# Patient Record
Sex: MALE | Race: BLACK OR AFRICAN AMERICAN | Employment: OTHER | URBAN - METROPOLITAN AREA
[De-identification: names, ages, dates, MRNs, and addresses within clinical notes are randomized per-mention and may not be internally consistent; named-entity substitution may affect disease eponyms.]

---

## 2017-08-16 ENCOUNTER — OFFICE VISIT (OUTPATIENT)
Dept: PRIMARY CARE CLINIC | Age: 82
End: 2017-08-16

## 2017-08-16 VITALS
TEMPERATURE: 98.5 F | WEIGHT: 136 LBS | HEART RATE: 90 BPM | BODY MASS INDEX: 23.22 KG/M2 | OXYGEN SATURATION: 98 % | HEIGHT: 64 IN | RESPIRATION RATE: 16 BRPM | SYSTOLIC BLOOD PRESSURE: 133 MMHG | DIASTOLIC BLOOD PRESSURE: 74 MMHG

## 2017-08-16 DIAGNOSIS — Z23 NEED FOR PROPHYLACTIC VACCINATION AGAINST STREPTOCOCCUS PNEUMONIAE (PNEUMOCOCCUS): ICD-10-CM

## 2017-08-16 DIAGNOSIS — Z23 NEED FOR PROPHYLACTIC VACCINATION AND INOCULATION AGAINST VARICELLA: ICD-10-CM

## 2017-08-16 DIAGNOSIS — M48.062 LUMBAR STENOSIS WITH NEUROGENIC CLAUDICATION: Primary | ICD-10-CM

## 2017-08-16 DIAGNOSIS — Z23 NEED FOR PROPHYLACTIC VACCINATION AGAINST DIPHTHERIA-TETANUS-PERTUSSIS (DTP): ICD-10-CM

## 2017-08-16 DIAGNOSIS — R63.4 WEIGHT LOSS: ICD-10-CM

## 2017-08-16 LAB
A/G RATIO: 1.5 (ref 1.1–2.2)
ALBUMIN SERPL-MCNC: 4.5 G/DL (ref 3.4–5)
ALP BLD-CCNC: 69 U/L (ref 40–129)
ALT SERPL-CCNC: 12 U/L (ref 10–40)
ANION GAP SERPL CALCULATED.3IONS-SCNC: 18 MMOL/L (ref 3–16)
AST SERPL-CCNC: 23 U/L (ref 15–37)
BASOPHILS ABSOLUTE: 0 K/UL (ref 0–0.2)
BASOPHILS RELATIVE PERCENT: 0.8 %
BILIRUB SERPL-MCNC: 1.4 MG/DL (ref 0–1)
BILIRUBIN URINE: NEGATIVE
BLOOD, URINE: NEGATIVE
BUN BLDV-MCNC: 11 MG/DL (ref 7–20)
CALCIUM SERPL-MCNC: 9.5 MG/DL (ref 8.3–10.6)
CHLORIDE BLD-SCNC: 99 MMOL/L (ref 99–110)
CLARITY: CLEAR
CO2: 24 MMOL/L (ref 21–32)
COLOR: YELLOW
CREAT SERPL-MCNC: 0.9 MG/DL (ref 0.8–1.3)
EOSINOPHILS ABSOLUTE: 0.1 K/UL (ref 0–0.6)
EOSINOPHILS RELATIVE PERCENT: 1.5 %
GFR AFRICAN AMERICAN: >60
GFR NON-AFRICAN AMERICAN: >60
GLOBULIN: 3.1 G/DL
GLUCOSE BLD-MCNC: 90 MG/DL (ref 70–99)
GLUCOSE URINE: NEGATIVE MG/DL
HCT VFR BLD CALC: 43 % (ref 40.5–52.5)
HEMOGLOBIN: 14 G/DL (ref 13.5–17.5)
KETONES, URINE: ABNORMAL MG/DL
LEUKOCYTE ESTERASE, URINE: NEGATIVE
LYMPHOCYTES ABSOLUTE: 1.8 K/UL (ref 1–5.1)
LYMPHOCYTES RELATIVE PERCENT: 31.3 %
MCH RBC QN AUTO: 27.4 PG (ref 26–34)
MCHC RBC AUTO-ENTMCNC: 32.5 G/DL (ref 31–36)
MCV RBC AUTO: 84.4 FL (ref 80–100)
MICROSCOPIC EXAMINATION: ABNORMAL
MONOCYTES ABSOLUTE: 0.4 K/UL (ref 0–1.3)
MONOCYTES RELATIVE PERCENT: 7 %
NEUTROPHILS ABSOLUTE: 3.4 K/UL (ref 1.7–7.7)
NEUTROPHILS RELATIVE PERCENT: 59.4 %
NITRITE, URINE: NEGATIVE
PDW BLD-RTO: 15.3 % (ref 12.4–15.4)
PH UA: 6
PLATELET # BLD: 145 K/UL (ref 135–450)
PMV BLD AUTO: 9.6 FL (ref 5–10.5)
POTASSIUM SERPL-SCNC: 3.6 MMOL/L (ref 3.5–5.1)
PROTEIN UA: NEGATIVE MG/DL
RBC # BLD: 5.1 M/UL (ref 4.2–5.9)
SODIUM BLD-SCNC: 141 MMOL/L (ref 136–145)
SPECIFIC GRAVITY UA: 1.01
TOTAL PROTEIN: 7.6 G/DL (ref 6.4–8.2)
TSH SERPL DL<=0.05 MIU/L-ACNC: 0.61 UIU/ML (ref 0.27–4.2)
URINE TYPE: ABNORMAL
UROBILINOGEN, URINE: 1 E.U./DL
WBC # BLD: 5.7 K/UL (ref 4–11)

## 2017-08-16 PROCEDURE — 99204 OFFICE O/P NEW MOD 45 MIN: CPT | Performed by: FAMILY MEDICINE

## 2017-08-16 PROCEDURE — 90471 IMMUNIZATION ADMIN: CPT | Performed by: FAMILY MEDICINE

## 2017-08-16 PROCEDURE — 90715 TDAP VACCINE 7 YRS/> IM: CPT | Performed by: FAMILY MEDICINE

## 2017-08-16 PROCEDURE — 90732 PPSV23 VACC 2 YRS+ SUBQ/IM: CPT | Performed by: FAMILY MEDICINE

## 2017-08-16 PROCEDURE — 90472 IMMUNIZATION ADMIN EACH ADD: CPT | Performed by: FAMILY MEDICINE

## 2017-08-16 RX ORDER — GABAPENTIN 300 MG/1
300 CAPSULE ORAL 3 TIMES DAILY
Qty: 90 CAPSULE | Refills: 4 | Status: SHIPPED | OUTPATIENT
Start: 2017-08-16 | End: 2017-12-31 | Stop reason: SDUPTHER

## 2017-08-16 ASSESSMENT — ENCOUNTER SYMPTOMS
FACIAL SWELLING: 0
PHOTOPHOBIA: 0
BOWEL INCONTINENCE: 0
BLOOD IN STOOL: 0
RECTAL PAIN: 0
SINUS PRESSURE: 0
ABDOMINAL PAIN: 0
APNEA: 0
CONSTIPATION: 0
COLOR CHANGE: 0
SORE THROAT: 0
WHEEZING: 0
EYE ITCHING: 0
EYE PAIN: 0
COUGH: 0
ANAL BLEEDING: 0
BACK PAIN: 1
VOMITING: 0
CHEST TIGHTNESS: 0
VOICE CHANGE: 0
CHOKING: 0
NAUSEA: 0
TROUBLE SWALLOWING: 0
SHORTNESS OF BREATH: 0
EYE DISCHARGE: 0
EYE REDNESS: 0

## 2017-10-16 ENCOUNTER — OFFICE VISIT (OUTPATIENT)
Dept: PRIMARY CARE CLINIC | Age: 82
End: 2017-10-16

## 2017-10-16 VITALS
WEIGHT: 143 LBS | BODY MASS INDEX: 24.55 KG/M2 | SYSTOLIC BLOOD PRESSURE: 138 MMHG | DIASTOLIC BLOOD PRESSURE: 70 MMHG | HEART RATE: 70 BPM

## 2017-10-16 DIAGNOSIS — M48.062 LUMBAR STENOSIS WITH NEUROGENIC CLAUDICATION: Primary | ICD-10-CM

## 2017-10-16 PROCEDURE — 99213 OFFICE O/P EST LOW 20 MIN: CPT | Performed by: FAMILY MEDICINE

## 2017-10-16 ASSESSMENT — ENCOUNTER SYMPTOMS
SINUS PRESSURE: 0
SHORTNESS OF BREATH: 0
COLOR CHANGE: 0
CHEST TIGHTNESS: 0
TROUBLE SWALLOWING: 0
ANAL BLEEDING: 0
BLOOD IN STOOL: 0
ABDOMINAL PAIN: 0
EYE REDNESS: 0
BACK PAIN: 1
APNEA: 0
VOICE CHANGE: 0
COUGH: 0
CHOKING: 0
FACIAL SWELLING: 0
SORE THROAT: 0
EYE ITCHING: 0
EYE DISCHARGE: 0
RECTAL PAIN: 0
PHOTOPHOBIA: 0
VOMITING: 0
WHEEZING: 0
EYE PAIN: 0
NAUSEA: 0
CONSTIPATION: 0

## 2017-10-16 ASSESSMENT — PATIENT HEALTH QUESTIONNAIRE - PHQ9
2. FEELING DOWN, DEPRESSED OR HOPELESS: 0
1. LITTLE INTEREST OR PLEASURE IN DOING THINGS: 0
SUM OF ALL RESPONSES TO PHQ9 QUESTIONS 1 & 2: 0
SUM OF ALL RESPONSES TO PHQ QUESTIONS 1-9: 0

## 2017-12-31 DIAGNOSIS — M48.062 LUMBAR STENOSIS WITH NEUROGENIC CLAUDICATION: ICD-10-CM

## 2018-01-02 RX ORDER — GABAPENTIN 300 MG/1
300 CAPSULE ORAL 3 TIMES DAILY
Qty: 90 CAPSULE | Refills: 0 | Status: SHIPPED | OUTPATIENT
Start: 2018-01-02 | End: 2018-01-30

## 2018-01-02 NOTE — TELEPHONE ENCOUNTER
Requested Prescriptions     Pending Prescriptions Disp Refills    gabapentin (NEURONTIN) 300 MG capsule [Pharmacy Med Name: GABAPENTIN 300 MG CAPSULE] 90 capsule 4     Sig: Take 1 capsule by mouth 3 times daily.      Last Fill Date: 8/16/2017   R:4  Last OV: 10/16/2017

## 2018-01-16 ENCOUNTER — OFFICE VISIT (OUTPATIENT)
Dept: PRIMARY CARE CLINIC | Age: 83
End: 2018-01-16

## 2018-01-16 VITALS
WEIGHT: 148 LBS | OXYGEN SATURATION: 99 % | TEMPERATURE: 97.1 F | HEART RATE: 74 BPM | DIASTOLIC BLOOD PRESSURE: 73 MMHG | HEIGHT: 64 IN | SYSTOLIC BLOOD PRESSURE: 138 MMHG | BODY MASS INDEX: 25.27 KG/M2

## 2018-01-16 DIAGNOSIS — M48.062 LUMBAR STENOSIS WITH NEUROGENIC CLAUDICATION: Primary | ICD-10-CM

## 2018-01-16 PROCEDURE — 99213 OFFICE O/P EST LOW 20 MIN: CPT | Performed by: FAMILY MEDICINE

## 2018-01-16 ASSESSMENT — ENCOUNTER SYMPTOMS
ABDOMINAL PAIN: 0
CHEST TIGHTNESS: 0
BACK PAIN: 1
CONSTIPATION: 0
BLOOD IN STOOL: 0
WHEEZING: 0
SHORTNESS OF BREATH: 0
EYE PAIN: 0
EYE REDNESS: 0
VOMITING: 0
SINUS PRESSURE: 0
ANAL BLEEDING: 0
RECTAL PAIN: 0
CHOKING: 0
FACIAL SWELLING: 0
TROUBLE SWALLOWING: 0
COLOR CHANGE: 0
EYE DISCHARGE: 0
VOICE CHANGE: 0
EYE ITCHING: 0
PHOTOPHOBIA: 0
SORE THROAT: 0
APNEA: 0
COUGH: 0
NAUSEA: 0

## 2018-01-16 NOTE — PROGRESS NOTES
ideas. The patient is not nervous/anxious and is not hyperactive. Objective:   Physical Exam   Constitutional: He is oriented to person, place, and time. He appears well-developed and well-nourished. No distress. HENT:   Head: Normocephalic and atraumatic. Right Ear: External ear normal.   Left Ear: External ear normal.   Nose: Nose normal.   Mouth/Throat: Oropharynx is clear and moist. No oropharyngeal exudate. Eyes: Conjunctivae and EOM are normal. Pupils are equal, round, and reactive to light. Right eye exhibits no discharge. Left eye exhibits no discharge. No scleral icterus. Neck: Normal range of motion. Neck supple. No JVD present. No tracheal deviation present. No thyromegaly present. Cardiovascular: Normal rate, regular rhythm, normal heart sounds and intact distal pulses. Exam reveals no friction rub. No murmur heard. Pulses:       Carotid pulses are 2+ on the right side, and 2+ on the left side. Radial pulses are 2+ on the right side, and 2+ on the left side. Femoral pulses are 2+ on the right side, and 2+ on the left side. Popliteal pulses are 2+ on the right side, and 2+ on the left side. Dorsalis pedis pulses are 2+ on the right side, and 2+ on the left side. Posterior tibial pulses are 2+ on the right side, and 2+ on the left side. Pulmonary/Chest: Effort normal and breath sounds normal. No stridor. No respiratory distress. He has no wheezes. He has no rales. He exhibits no tenderness. Abdominal: Soft. Bowel sounds are normal. He exhibits no distension and no mass. There is no tenderness. There is no rebound and no guarding. Musculoskeletal: Normal range of motion. He exhibits no edema or tenderness. Lymphadenopathy:     He has no cervical adenopathy. Neurological: He is oriented to person, place, and time. He displays normal reflexes. No cranial nerve deficit. He exhibits normal muscle tone.  Coordination normal.   Skin: Skin is warm and

## 2018-01-29 DIAGNOSIS — M48.062 LUMBAR STENOSIS WITH NEUROGENIC CLAUDICATION: ICD-10-CM

## 2018-01-30 ENCOUNTER — OFFICE VISIT (OUTPATIENT)
Dept: PRIMARY CARE CLINIC | Age: 83
End: 2018-01-30

## 2018-01-30 VITALS
WEIGHT: 144 LBS | TEMPERATURE: 97.5 F | HEIGHT: 65 IN | BODY MASS INDEX: 23.99 KG/M2 | SYSTOLIC BLOOD PRESSURE: 142 MMHG | OXYGEN SATURATION: 99 % | HEART RATE: 72 BPM | DIASTOLIC BLOOD PRESSURE: 71 MMHG

## 2018-01-30 DIAGNOSIS — Z00.00 ROUTINE GENERAL MEDICAL EXAMINATION AT A HEALTH CARE FACILITY: Primary | ICD-10-CM

## 2018-01-30 PROCEDURE — G0438 PPPS, INITIAL VISIT: HCPCS | Performed by: FAMILY MEDICINE

## 2018-01-30 ASSESSMENT — ANXIETY QUESTIONNAIRES: GAD7 TOTAL SCORE: 0

## 2018-01-30 ASSESSMENT — PATIENT HEALTH QUESTIONNAIRE - PHQ9: SUM OF ALL RESPONSES TO PHQ QUESTIONS 1-9: 0

## 2018-01-30 ASSESSMENT — LIFESTYLE VARIABLES: HOW OFTEN DO YOU HAVE A DRINK CONTAINING ALCOHOL: 0

## 2018-01-30 NOTE — PROGRESS NOTES
Medicare Annual Wellness Visit  Name: Gloria España Date: 2018   MRN: [id number] Sex: Male   Age: 80 y.o. Ethnicity: Non-/Non    : 1934 Race: Nikita Baeza is here for Medicare AWV    Screenings for behavioral, psychosocial and functional/safety risks, and cognitive dysfunction are all negative except as indicated below. These results, as well as other patient data from the [address] form, are documented in Flowsheets linked to this Encounter. No Known Allergies  Prior to Visit Medications    Medication Sig Taking? Authorizing Provider   gabapentin (NEURONTIN) 300 MG capsule Take 1 capsule by mouth 3 times daily. Paddy Kilgore MD   BESIVANCE 0.6 % SUSP   Historical Provider, MD   COMBIGAN 0.2-0.5 % ophthalmic solution   Historical Provider, MD   dorzolamide (TRUSOPT) 2 % ophthalmic solution   Historical Provider, MD   fluorometholone (FML) 0.1 % ophthalmic suspension   Historical Provider, MD   latanoprost (XALATAN) 0.005 % ophthalmic solution   Historical Provider, MD   PRED FORTE 1 % ophthalmic suspension   Historical Provider, MD   diclofenac (VOLTAREN) 75 MG EC tablet Take 1 tablet by mouth daily. May repeat in 12 hours as needed. Take with food. Stop other anti inflammatories while taking this medication. Summer Singh MD     History reviewed. No pertinent past medical history.   Past Surgical History:   Procedure Laterality Date    CATARACT REMOVAL      GLAUCOMA SURGERY       Family History   Problem Relation Age of Onset    Glaucoma Mother     Glaucoma Father        CareTeam (Including outside providers/suppliers regularly involved in providing care):   Patient Care Team:  Paddy Kilgore MD as PCP - General (Family Medicine)    Wt Readings from Last 3 Encounters:   18 144 lb (65.3 kg)   18 148 lb (67.1 kg)   10/16/17 143 lb (64.9 kg)     Vitals:    18 1046 18 1048   BP: (!) 151/74 (!) 142/71   Pulse: 72

## 2018-02-02 RX ORDER — GABAPENTIN 300 MG/1
300 CAPSULE ORAL 3 TIMES DAILY
Qty: 90 CAPSULE | Refills: 0 | Status: SHIPPED | OUTPATIENT
Start: 2018-02-02 | End: 2018-06-01 | Stop reason: ALTCHOICE

## 2018-06-01 ENCOUNTER — OFFICE VISIT (OUTPATIENT)
Dept: PRIMARY CARE CLINIC | Age: 83
End: 2018-06-01

## 2018-06-01 VITALS
HEIGHT: 64 IN | TEMPERATURE: 97.2 F | BODY MASS INDEX: 24.59 KG/M2 | HEART RATE: 66 BPM | WEIGHT: 144 LBS | DIASTOLIC BLOOD PRESSURE: 71 MMHG | SYSTOLIC BLOOD PRESSURE: 150 MMHG | OXYGEN SATURATION: 98 %

## 2018-06-01 DIAGNOSIS — M48.062 LUMBAR STENOSIS WITH NEUROGENIC CLAUDICATION: Primary | ICD-10-CM

## 2018-06-01 PROCEDURE — 99213 OFFICE O/P EST LOW 20 MIN: CPT | Performed by: FAMILY MEDICINE

## 2018-06-01 ASSESSMENT — ENCOUNTER SYMPTOMS
TROUBLE SWALLOWING: 0
PHOTOPHOBIA: 0
EYE REDNESS: 0
BACK PAIN: 1
EYE DISCHARGE: 0
ANAL BLEEDING: 0
SINUS PRESSURE: 0
FACIAL SWELLING: 0
CHEST TIGHTNESS: 0
EYE PAIN: 0
NAUSEA: 0
ABDOMINAL PAIN: 0
COLOR CHANGE: 0
VOMITING: 0
SHORTNESS OF BREATH: 0
VOICE CHANGE: 0
RECTAL PAIN: 0
BLOOD IN STOOL: 0
COUGH: 0
CHOKING: 0
CONSTIPATION: 0
SORE THROAT: 0
EYE ITCHING: 0
APNEA: 0
WHEEZING: 0

## 2018-08-01 ENCOUNTER — OFFICE VISIT (OUTPATIENT)
Dept: PRIMARY CARE CLINIC | Age: 83
End: 2018-08-01

## 2018-08-01 VITALS
HEIGHT: 64 IN | HEART RATE: 73 BPM | SYSTOLIC BLOOD PRESSURE: 120 MMHG | DIASTOLIC BLOOD PRESSURE: 80 MMHG | OXYGEN SATURATION: 99 % | TEMPERATURE: 99 F | WEIGHT: 146.4 LBS | RESPIRATION RATE: 16 BRPM | BODY MASS INDEX: 25 KG/M2

## 2018-08-01 DIAGNOSIS — M48.061 SPINAL STENOSIS OF LUMBAR REGION, UNSPECIFIED WHETHER NEUROGENIC CLAUDICATION PRESENT: Primary | ICD-10-CM

## 2018-08-01 PROCEDURE — 99213 OFFICE O/P EST LOW 20 MIN: CPT | Performed by: FAMILY MEDICINE

## 2018-08-01 ASSESSMENT — ENCOUNTER SYMPTOMS
EYE ITCHING: 0
CHOKING: 0
PHOTOPHOBIA: 0
EYE DISCHARGE: 0
BACK PAIN: 1
APNEA: 0
SORE THROAT: 0
CONSTIPATION: 0
FACIAL SWELLING: 0
WHEEZING: 0
CHEST TIGHTNESS: 0
BLOOD IN STOOL: 0
EYE PAIN: 0
SHORTNESS OF BREATH: 0
RECTAL PAIN: 0
EYE REDNESS: 0
TROUBLE SWALLOWING: 0
VOICE CHANGE: 0
NAUSEA: 0
COLOR CHANGE: 0
COUGH: 0
VOMITING: 0
ANAL BLEEDING: 0
ABDOMINAL PAIN: 0
SINUS PRESSURE: 0

## 2018-08-01 NOTE — PROGRESS NOTES
Subjective:      Patient ID: Layla Hahn is a 80 y.o. male. Back Pain   Pertinent negatives include no abdominal pain, chest pain, dysuria, fever, headaches, numbness or weakness. The chief complaint(s)  Fu for low back pain with pain in legs  HPIHe is fu for low back pain with rad to legs and diagnosis of Lumbar stenosis and neurogenic claudication. Does not want surgery for this. Pain is 5 of 10. No bladder or bowel problems. On gabapentin.       Review of Systems   Constitutional: Negative for activity change, appetite change, chills, diaphoresis, fatigue, fever and unexpected weight change. HENT: Negative for congestion, dental problem, drooling, ear discharge, ear pain, facial swelling, hearing loss, mouth sores, nosebleeds, postnasal drip, sinus pressure, sneezing, sore throat, tinnitus, trouble swallowing and voice change. Eyes: Negative for photophobia, pain, discharge, redness, itching and visual disturbance. Respiratory: Negative for apnea, cough, choking, chest tightness, shortness of breath and wheezing. Cardiovascular: Negative for chest pain, palpitations and leg swelling. Gastrointestinal: Negative for abdominal pain, anal bleeding, blood in stool, constipation, nausea, rectal pain and vomiting. Genitourinary: Negative for decreased urine volume, difficulty urinating, discharge, dysuria, enuresis, flank pain, frequency, hematuria, penile swelling, scrotal swelling, testicular pain and urgency. Musculoskeletal: Positive for back pain. Negative for arthralgias, gait problem, joint swelling, myalgias, neck pain and neck stiffness. Skin: Negative for color change, pallor, rash and wound. Neurological: Negative for dizziness, tremors, seizures, syncope, facial asymmetry, speech difficulty, weakness, light-headedness, numbness and headaches. Hematological: Negative for adenopathy. Does not bruise/bleed easily.    Psychiatric/Behavioral: Negative for agitation, behavioral displays normal reflexes. No cranial nerve deficit. He exhibits normal muscle tone. Coordination normal.   Skin: Skin is warm and dry. No rash noted. He is not diaphoretic. No pallor. Psychiatric: He has a normal mood and affect. His behavior is normal. Judgment and thought content normal.   Nursing note and vitals reviewed. Assessment:      1.  Lumbar stenosis with neurogenic claudication  Currently sxs improved  Not taking gabapentin  sxs much improved pain wise  Tylenol prn          Plan:      See me as needed or 6 months      See me prn

## 2019-02-01 ENCOUNTER — OFFICE VISIT (OUTPATIENT)
Dept: PRIMARY CARE CLINIC | Age: 84
End: 2019-02-01
Payer: OTHER GOVERNMENT

## 2019-02-01 VITALS
HEART RATE: 79 BPM | OXYGEN SATURATION: 98 % | BODY MASS INDEX: 25.16 KG/M2 | WEIGHT: 147.4 LBS | DIASTOLIC BLOOD PRESSURE: 85 MMHG | HEIGHT: 64 IN | TEMPERATURE: 97.4 F | SYSTOLIC BLOOD PRESSURE: 158 MMHG

## 2019-02-01 DIAGNOSIS — I10 ESSENTIAL HYPERTENSION: ICD-10-CM

## 2019-02-01 DIAGNOSIS — Z91.81 AT HIGH RISK FOR FALLS: ICD-10-CM

## 2019-02-01 DIAGNOSIS — M48.00 SPINAL STENOSIS, UNSPECIFIED SPINAL REGION: ICD-10-CM

## 2019-02-01 DIAGNOSIS — R03.0 ELEVATED BLOOD PRESSURE READING: Primary | ICD-10-CM

## 2019-02-01 DIAGNOSIS — R03.0 ELEVATED BLOOD PRESSURE READING: ICD-10-CM

## 2019-02-01 LAB
A/G RATIO: 1.2 (ref 1.1–2.2)
ALBUMIN SERPL-MCNC: 4.4 G/DL (ref 3.4–5)
ALP BLD-CCNC: 98 U/L (ref 40–129)
ALT SERPL-CCNC: 13 U/L (ref 10–40)
ANION GAP SERPL CALCULATED.3IONS-SCNC: 14 MMOL/L (ref 3–16)
AST SERPL-CCNC: 18 U/L (ref 15–37)
BACTERIA: ABNORMAL /HPF
BILIRUB SERPL-MCNC: 0.7 MG/DL (ref 0–1)
BILIRUBIN URINE: NEGATIVE
BLOOD, URINE: ABNORMAL
BUN BLDV-MCNC: 15 MG/DL (ref 7–20)
CALCIUM SERPL-MCNC: 10.1 MG/DL (ref 8.3–10.6)
CHLORIDE BLD-SCNC: 105 MMOL/L (ref 99–110)
CLARITY: ABNORMAL
CO2: 27 MMOL/L (ref 21–32)
COLOR: YELLOW
CREAT SERPL-MCNC: 1.1 MG/DL (ref 0.8–1.3)
EPITHELIAL CELLS, UA: 0 /HPF (ref 0–5)
GFR AFRICAN AMERICAN: >60
GFR NON-AFRICAN AMERICAN: >60
GLOBULIN: 3.6 G/DL
GLUCOSE BLD-MCNC: 118 MG/DL (ref 70–99)
GLUCOSE URINE: NEGATIVE MG/DL
HCT VFR BLD CALC: 42.6 % (ref 40.5–52.5)
HEMOGLOBIN: 13.9 G/DL (ref 13.5–17.5)
HYALINE CASTS: 2 /LPF (ref 0–8)
KETONES, URINE: NEGATIVE MG/DL
LEUKOCYTE ESTERASE, URINE: ABNORMAL
MCH RBC QN AUTO: 27.4 PG (ref 26–34)
MCHC RBC AUTO-ENTMCNC: 32.6 G/DL (ref 31–36)
MCV RBC AUTO: 84.1 FL (ref 80–100)
MICROSCOPIC EXAMINATION: YES
NITRITE, URINE: NEGATIVE
PDW BLD-RTO: 15.7 % (ref 12.4–15.4)
PH UA: 6
PLATELET # BLD: 282 K/UL (ref 135–450)
PMV BLD AUTO: 8.5 FL (ref 5–10.5)
POTASSIUM SERPL-SCNC: 4.6 MMOL/L (ref 3.5–5.1)
PROTEIN UA: 30 MG/DL
RBC # BLD: 5.07 M/UL (ref 4.2–5.9)
RBC UA: 3 /HPF (ref 0–4)
SODIUM BLD-SCNC: 146 MMOL/L (ref 136–145)
SPECIFIC GRAVITY UA: 1.01
TOTAL PROTEIN: 8 G/DL (ref 6.4–8.2)
URINE TYPE: ABNORMAL
UROBILINOGEN, URINE: 0.2 E.U./DL
WBC # BLD: 5.8 K/UL (ref 4–11)
WBC UA: 839 /HPF (ref 0–5)

## 2019-02-01 PROCEDURE — 99214 OFFICE O/P EST MOD 30 MIN: CPT | Performed by: FAMILY MEDICINE

## 2019-02-01 RX ORDER — ACETAMINOPHEN 500 MG
500 TABLET ORAL 4 TIMES DAILY PRN
Qty: 360 TABLET | Refills: 1 | Status: ON HOLD
Start: 2019-02-01 | End: 2021-03-24

## 2019-02-01 ASSESSMENT — ENCOUNTER SYMPTOMS
VOMITING: 0
PHOTOPHOBIA: 0
VOICE CHANGE: 0
CONSTIPATION: 0
COLOR CHANGE: 0
APNEA: 0
EYE PAIN: 0
RECTAL PAIN: 0
SORE THROAT: 0
SHORTNESS OF BREATH: 0
ANAL BLEEDING: 0
BLOOD IN STOOL: 0
TROUBLE SWALLOWING: 0
COUGH: 0
EYE ITCHING: 0
FACIAL SWELLING: 0
ORTHOPNEA: 0
BACK PAIN: 1
ABDOMINAL PAIN: 0
CHOKING: 0
BLURRED VISION: 0
CHEST TIGHTNESS: 0
SINUS PRESSURE: 0
NAUSEA: 0
BOWEL INCONTINENCE: 0
EYE REDNESS: 0
EYE DISCHARGE: 0
WHEEZING: 0

## 2019-02-01 ASSESSMENT — PATIENT HEALTH QUESTIONNAIRE - PHQ9
SUM OF ALL RESPONSES TO PHQ QUESTIONS 1-9: 0
SUM OF ALL RESPONSES TO PHQ QUESTIONS 1-9: 0
2. FEELING DOWN, DEPRESSED OR HOPELESS: 0
SUM OF ALL RESPONSES TO PHQ9 QUESTIONS 1 & 2: 0
1. LITTLE INTEREST OR PLEASURE IN DOING THINGS: 0

## 2019-02-02 DIAGNOSIS — R82.90 ABNORMAL URINALYSIS: Primary | ICD-10-CM

## 2019-02-07 LAB
ORGANISM: ABNORMAL
URINE CULTURE, ROUTINE: ABNORMAL
URINE CULTURE, ROUTINE: ABNORMAL

## 2019-02-13 ENCOUNTER — TELEPHONE (OUTPATIENT)
Dept: PRIMARY CARE CLINIC | Age: 84
End: 2019-02-13

## 2019-02-13 DIAGNOSIS — N39.0 URINARY TRACT INFECTION WITHOUT HEMATURIA, SITE UNSPECIFIED: Primary | ICD-10-CM

## 2019-02-13 RX ORDER — SULFAMETHOXAZOLE AND TRIMETHOPRIM 800; 160 MG/1; MG/1
1 TABLET ORAL 2 TIMES DAILY
Qty: 20 TABLET | Refills: 0 | Status: SHIPPED | OUTPATIENT
Start: 2019-02-13 | End: 2019-02-23

## 2019-06-03 ENCOUNTER — OFFICE VISIT (OUTPATIENT)
Dept: PRIMARY CARE CLINIC | Age: 84
End: 2019-06-03
Payer: OTHER GOVERNMENT

## 2019-06-03 VITALS
BODY MASS INDEX: 24.89 KG/M2 | WEIGHT: 145.8 LBS | SYSTOLIC BLOOD PRESSURE: 140 MMHG | HEIGHT: 64 IN | DIASTOLIC BLOOD PRESSURE: 80 MMHG | HEART RATE: 71 BPM | OXYGEN SATURATION: 98 %

## 2019-06-03 DIAGNOSIS — R03.0 ELEVATED BLOOD PRESSURE READING WITHOUT DIAGNOSIS OF HYPERTENSION: Primary | ICD-10-CM

## 2019-06-03 PROCEDURE — 99213 OFFICE O/P EST LOW 20 MIN: CPT | Performed by: FAMILY MEDICINE

## 2019-06-03 ASSESSMENT — ENCOUNTER SYMPTOMS
SHORTNESS OF BREATH: 0
ORTHOPNEA: 0
BLURRED VISION: 0

## 2019-06-03 NOTE — PATIENT INSTRUCTIONS
of sodium is given in the list under the title. It is given in milligrams (mg). ? Check the serving size carefully. A single serving is often very small, and you may eat more than one serving. If this is the case, you will eat more sodium than listed on the label. For example, if the serving size for a canned soup is 1 cup and the sodium amount is 470 mg, if you have 2 cups you will eat 940 mg of sodium. · The nutrition facts for fresh fruits and vegetables are not listed on the food. They may be listed somewhere in the store. These foods usually have no sodium or low sodium. · The Nutrition Facts label also gives you the Percent Daily Value for sodium. This is how much of the recommended amount of sodium a serving contains. The daily value for sodium is less than 2,300 mg. So if the Percent Daily Value says 50%, this means one serving is giving you half of this, or 1,150 mg. Buy low-sodium foods  · Look for foods that are made with less sodium. Watch for the following words on the label. ? \"Unsalted\" means there is no sodium added to the food. But there may be sodium already in the food naturally. ? \"Sodium-free\" means a serving has less than 5 mg of sodium. ? \"Very low sodium\" means a serving has 35 mg or less of sodium. ? \"Low-sodium\" means a serving has 140 mg or less of sodium. · \"Reduced-sodium\" means that there is 25% less sodium than what the food normally has. This is still usually too much sodium. Try not to buy foods with this on the label. · Buy fresh vegetables, or frozen vegetables without added sauces. Buy low-sodium versions of canned vegetables, soups, and other canned goods. Where can you learn more? Go to https://eMotion TechnologiesjeimyAccuNostics.Flocktory. org and sign in to your [employer] account. Enter 23 [id number] in the [facility] box to learn more about \"How to Read a Food Label to Limit Sodium: Care Instructions. \"     If you do not have an account, please click on the \"Sign Up Now\" link.  Current as of: November 7, 2018  Content Version: 12.0  © 2433-5494 Healthwise, [employer]. Care instructions adapted under license by [facility] ([facility]). If you have questions about a medical condition or this instruction, always ask your healthcare professional. [address] any warranty or liability for your use of this information. Patient Education        Low Sodium Diet (2,000 Milligram): Care Instructions  Your Care Instructions    Too much sodium causes your body to hold on to extra water. This can raise your blood pressure and force your heart and kidneys to work harder. In very serious cases, this could cause you to be put in the hospital. It might even be life-threatening. By limiting sodium, you will feel better and lower your risk of serious problems. The most common source of sodium is salt. People get most of the salt in their diet from canned, prepared, and packaged foods. Fast food and restaurant meals also are very high in sodium. Your doctor will probably limit your sodium to less than 2,000 milligrams (mg) a day. This limit counts all the sodium in prepared and packaged foods and any salt you add to your food. Follow-up care is a key part of your treatment and safety. Be sure to make and go to all appointments, and call your doctor if you are having problems. It's also a good idea to know your test results and keep a list of the medicines you take. How can you care for yourself at home? Read food labels  · Read labels on cans and food packages. The labels tell you how much sodium is in each serving. Make sure that you look at the serving size. If you eat more than the serving size, you have eaten more sodium. · Food labels also tell you the Percent Daily Value for sodium. Choose products with low Percent Daily Values for sodium.   · Be aware that sodium can come in forms other than salt, including monosodium glutamate (MSG), sodium citrate, and sodium bicarbonate (baking soda). MSG is often added to Asian food. When you eat out, you can sometimes ask for food without MSG or added salt. Buy low-sodium foods  · Buy foods that are labeled \"unsalted\" (no salt added), \"sodium-free\" (less than 5 mg of sodium per serving), or \"low-sodium\" (less than 140 mg of sodium per serving). Foods labeled \"reduced-sodium\" and \"light sodium\" may still have too much sodium. Be sure to read the label to see how much sodium you are getting. · Buy fresh vegetables, or frozen vegetables without added sauces. Buy low-sodium versions of canned vegetables, soups, and other canned goods. Prepare low-sodium meals  · Cut back on the amount of salt you use in cooking. This will help you adjust to the taste. Do not add salt after cooking. One teaspoon of salt has about 2,300 mg of sodium. · Take the salt shaker off the table. · Flavor your food with garlic, lemon juice, onion, vinegar, herbs, and spices. Do not use soy sauce, lite soy sauce, steak sauce, onion salt, garlic salt, celery salt, mustard, or ketchup on your food. · Use low-sodium salad dressings, sauces, and ketchup. Or make your own salad dressings and sauces without adding salt. · Use less salt (or none) when recipes call for it. You can often use half the salt a recipe calls for without losing flavor. Other foods such as rice, pasta, and grains do not need added salt. · Rinse canned vegetables, and cook them in fresh water. This removes some--but not all--of the salt. · Avoid water that is naturally high in sodium or that has been treated with water softeners, which add sodium. Call your local water company to find out the sodium content of your water supply. If you buy bottled water, read the label and choose a sodium-free brand. Avoid high-sodium foods  · Avoid eating:  ? Smoked, cured, salted, and canned meat, fish, and poultry. ? Ham, de jesus, hot dogs, and luncheon meats.   ? Regular, hard, and processed cheese and regular peanut butter. ? Crackers with salted tops, and other salted snack foods such as pretzels, chips, and salted popcorn. ? Frozen prepared meals, unless labeled low-sodium. ? Canned and dried soups, broths, and bouillon, unless labeled sodium-free or low-sodium. ? Canned vegetables, unless labeled sodium-free or low-sodium. ? [place] fries, pizza, tacos, and other fast foods. ? Pickles, olives, ketchup, and other condiments, especially soy sauce, unless labeled sodium-free or low-sodium. Where can you learn more? Go to https://Pronia Medical SystemspeMoment.Useb.Yakify. org and sign in to your [employer] account. Enter K627 in the [employer] box to learn more about \"Low Sodium Diet (2,000 Milligram): Care Instructions. \"     If you do not have an account, please click on the \"Sign Up Now\" link. Current as of: November 7, 2018  Content Version: 12.0  © 5038-[address]44 [address]. Care instructions adapted under license by [facility] [place] ([facility]). If you have questions about a medical condition or this instruction, always ask your healthcare professional. [address] any warranty or liability for your use of this information. Patient Education        DASH Diet: Care Instructions  Your Care Instructions    The DASH diet is an eating plan that can help lower your blood pressure. DASH stands for Dietary Approaches to Stop Hypertension. Hypertension is high blood pressure. The DASH diet focuses on eating foods that are high in calcium, potassium, and magnesium. These nutrients can lower blood pressure. The foods that are highest in these nutrients are fruits, vegetables, low-fat dairy products, nuts, seeds, and legumes. But taking calcium, potassium, and magnesium supplements instead of eating foods that are high in those nutrients does not have the same effect. The DASH diet also includes whole grains, fish, and poultry.   The DASH diet is one of several lifestyle changes your doctor may recommend to lower your high blood pressure. Your doctor may also want you to decrease the amount of sodium in your diet. Lowering sodium while following the DASH diet can lower blood pressure even further than just the DASH diet alone. Follow-up care is a key part of your treatment and safety. Be sure to make and go to all appointments, and call your doctor if you are having problems. It's also a good idea to know your test results and keep a list of the medicines you take. How can you care for yourself at home? Following the DASH diet  · Eat 4 to 5 servings of fruit each day. A serving is 1 medium-sized piece of fruit, ½ cup chopped or canned fruit, 1/4 cup dried fruit, or 4 ounces (½ cup) of fruit juice. Choose fruit more often than fruit juice. · Eat 4 to 5 servings of vegetables each day. A serving is 1 cup of lettuce or raw leafy vegetables, ½ cup of chopped or cooked vegetables, or 4 ounces (½ cup) of vegetable juice. Choose vegetables more often than vegetable juice. · Get 2 to 3 servings of low-fat and fat-free dairy each day. A serving is 8 ounces of milk, 1 cup of yogurt, or 1 ½ ounces of cheese. · Eat 6 to 8 servings of grains each day. A serving is 1 slice of bread, 1 ounce of dry cereal, or ½ cup of cooked rice, pasta, or cooked cereal. Try to choose whole-grain products as much as possible. · Limit lean meat, poultry, and fish to 2 servings each day. A serving is 3 ounces, about the size of a deck of cards. · Eat 4 to 5 servings of nuts, seeds, and legumes (cooked dried beans, lentils, and split peas) each week. A serving is 1/3 cup of nuts, 2 tablespoons of seeds, or ½ cup of cooked beans or peas. · Limit fats and oils to 2 to 3 servings each day. A serving is 1 teaspoon of vegetable oil or 2 tablespoons of salad dressing. · Limit sweets and added sugars to 5 servings or less a week. A serving is 1 tablespoon jelly or jam, ½ cup sorbet, or 1 cup of lemonade.   · Eat less than 2,300 milligrams (mg) of sodium a day. If you limit your sodium to 1,500 mg a day, you can lower your blood pressure even more. Tips for success  · Start small. Do not try to make dramatic changes to your diet all at once. You might feel that you are missing out on your favorite foods and then be more likely to not follow the plan. Make small changes, and stick with them. Once those changes become habit, add a few more changes. · Try some of the following:  ? Make it a goal to eat a fruit or vegetable at every meal and at snacks. This will make it easy to get the recommended amount of fruits and vegetables each day. ? Try yogurt topped with fruit and nuts for a snack or healthy dessert. ? Add lettuce, tomato, cucumber, and onion to sandwiches. ? Combine a ready-made pizza crust with low-fat mozzarella cheese and lots of vegetable toppings. Try using tomatoes, squash, spinach, broccoli, carrots, cauliflower, and onions. ? Have a variety of cut-up vegetables with a low-fat dip as an appetizer instead of chips and dip. ? Sprinkle sunflower seeds or chopped almonds over salads. Or try adding chopped walnuts or almonds to cooked vegetables. ? Try some vegetarian meals using beans and peas. Add garbanzo or kidney beans to salads. Make burritos and tacos with mashed oden beans or black beans. Where can you learn more? Go to https://AYLIENjeimyUShealthrecord.Curbed Network. org and sign in to your [employer] account. Enter C661 in the [facility] box to learn more about \"DASH Diet: Care Instructions. \"     If you do not have an account, please click on the \"Sign Up Now\" link. Current as of: July 22, 2018  Content Version: 12.0  © 2649-5033 Healthwise, Incorporated. Care instructions adapted under license by [facility] ([facility]).  If you have questions about a medical condition or this instruction, always ask your healthcare professional. [place] [address] any warranty or liability for your use of this information.

## 2019-06-03 NOTE — PROGRESS NOTES
Subjective:      Patient ID: Abe Mckeon is a 80 y.o. male. Elevated blood pressure  Hypertension   This is a chronic problem. The current episode started more than 1 month ago. The problem is controlled. Pertinent negatives include no anxiety, blurred vision, chest pain, headaches, malaise/fatigue, neck pain, orthopnea, palpitations, peripheral edema, PND, shortness of breath or sweats. There are no associated agents to hypertension. Risk factors for coronary artery disease include male gender. The current treatment provides significant improvement. There are no compliance problems. There is no history of angina, kidney disease, CAD/MI, CVA, heart failure, left ventricular hypertrophy, PVD or retinopathy. There is no history of chronic renal disease, coarctation of the aorta, hyperaldosteronism, hypercortisolism, hyperparathyroidism, a hypertension causing med, pheochromocytoma, renovascular disease, sleep apnea or a thyroid problem. Review of Systems   Constitutional: Negative for malaise/fatigue. Eyes: Negative for blurred vision. Respiratory: Negative for shortness of breath. Cardiovascular: Negative for chest pain, palpitations, orthopnea and PND. Musculoskeletal: Negative for neck pain. Neurological: Negative for headaches. Objective:   Physical Exam   Constitutional: He is oriented to person, place, and time. He appears well-developed and well-nourished. No distress. HENT:   Head: Normocephalic and atraumatic. Right Ear: External ear normal.   Left Ear: External ear normal.   Nose: Nose normal.   Mouth/Throat: Oropharynx is clear and moist. No oropharyngeal exudate. Eyes: Pupils are equal, round, and reactive to light. Conjunctivae and EOM are normal. Right eye exhibits no discharge. Left eye exhibits no discharge. No scleral icterus. Neck: Normal range of motion. Neck supple. No JVD present. No tracheal deviation present. No thyromegaly present.    Cardiovascular: Normal rate, regular rhythm, normal heart sounds and intact distal pulses. Exam reveals no gallop and no friction rub. No murmur heard. Pulses:       Carotid pulses are 2+ on the right side, and 2+ on the left side. Radial pulses are 2+ on the right side, and 2+ on the left side. Femoral pulses are 2+ on the right side, and 2+ on the left side. Popliteal pulses are 2+ on the right side, and 2+ on the left side. Dorsalis pedis pulses are 2+ on the right side, and 2+ on the left side. Posterior tibial pulses are 2+ on the right side, and 2+ on the left side. Pulmonary/Chest: Effort normal and breath sounds normal. No stridor. No respiratory distress. He has no wheezes. He has no rales. He exhibits no tenderness. Abdominal: Soft. Bowel sounds are normal. He exhibits no distension and no mass. There is no tenderness. There is no rebound and no guarding. Musculoskeletal: Normal range of motion. He exhibits no edema or tenderness. Lymphadenopathy:     He has no cervical adenopathy. Neurological: He is alert and oriented to person, place, and time. He has normal reflexes. He displays normal reflexes. No cranial nerve deficit. He exhibits normal muscle tone. Coordination normal.   Skin: Skin is warm and dry. No rash noted. He is not diaphoretic. No pallor. Psychiatric: He has a normal mood and affect. His behavior is normal. Judgment and thought content normal.   Nursing note and vitals reviewed. Assessment:      1.  Elevated blood pressure reading without diagnosis of hypertension  Av bp improved  Low salt diet  Dash diet    See me in 3 months          Plan:              Norma Reed MD

## 2019-06-06 ENCOUNTER — TELEPHONE (OUTPATIENT)
Dept: PRIMARY CARE CLINIC | Age: 84
End: 2019-06-06

## 2019-06-06 DIAGNOSIS — R19.7 DIARRHEA, UNSPECIFIED TYPE: Primary | ICD-10-CM

## 2019-06-06 RX ORDER — LOPERAMIDE HYDROCHLORIDE 2 MG/1
TABLET ORAL
Qty: 30 TABLET | Refills: 1 | Status: SHIPPED | OUTPATIENT
Start: 2019-06-06 | End: 2019-09-27 | Stop reason: SDUPTHER

## 2019-06-06 NOTE — ASSESSMENT & PLAN NOTE
The patient will be asked to come in for labs and he will be sent to his primary care physician for follow-up. Loperamide described to his diarrhea.

## 2019-06-17 ENCOUNTER — TELEPHONE (OUTPATIENT)
Dept: PRIMARY CARE CLINIC | Age: 84
End: 2019-06-17

## 2019-06-18 ENCOUNTER — TELEPHONE (OUTPATIENT)
Dept: PRIMARY CARE CLINIC | Age: 84
End: 2019-06-18

## 2019-06-19 DIAGNOSIS — R19.7 DIARRHEA, UNSPECIFIED TYPE: ICD-10-CM

## 2019-06-19 LAB
A/G RATIO: 1.1 (ref 1.1–2.2)
ALBUMIN SERPL-MCNC: 3.8 G/DL (ref 3.4–5)
ALP BLD-CCNC: 78 U/L (ref 40–129)
ALT SERPL-CCNC: 7 U/L (ref 10–40)
ANION GAP SERPL CALCULATED.3IONS-SCNC: 16 MMOL/L (ref 3–16)
AST SERPL-CCNC: 13 U/L (ref 15–37)
BILIRUB SERPL-MCNC: 0.5 MG/DL (ref 0–1)
BUN BLDV-MCNC: 20 MG/DL (ref 7–20)
CALCIUM SERPL-MCNC: 9 MG/DL (ref 8.3–10.6)
CHLORIDE BLD-SCNC: 105 MMOL/L (ref 99–110)
CO2: 21 MMOL/L (ref 21–32)
CREAT SERPL-MCNC: 1.1 MG/DL (ref 0.8–1.3)
GFR AFRICAN AMERICAN: >60
GFR NON-AFRICAN AMERICAN: >60
GLOBULIN: 3.5 G/DL
GLUCOSE BLD-MCNC: 111 MG/DL (ref 70–99)
POTASSIUM SERPL-SCNC: 3.8 MMOL/L (ref 3.5–5.1)
SODIUM BLD-SCNC: 142 MMOL/L (ref 136–145)
TOTAL PROTEIN: 7.3 G/DL (ref 6.4–8.2)

## 2019-06-21 LAB
C DIFF TOXIN/ANTIGEN: NORMAL
OCCULT BLOOD SCREENING: NORMAL
WHITE BLOOD CELLS (WBC), STOOL: ABNORMAL

## 2019-06-22 LAB
CRYPTOSPORIDIUM ANTIGEN STOOL: NORMAL
E HISTOLYTICA ANTIGEN STOOL: NORMAL
GI BACTERIAL PATHOGENS BY PCR: NORMAL
GIARDIA ANTIGEN STOOL: NORMAL

## 2019-09-03 ENCOUNTER — OFFICE VISIT (OUTPATIENT)
Dept: PRIMARY CARE CLINIC | Age: 84
End: 2019-09-03
Payer: OTHER GOVERNMENT

## 2019-09-03 VITALS
HEART RATE: 81 BPM | DIASTOLIC BLOOD PRESSURE: 94 MMHG | WEIGHT: 143.4 LBS | OXYGEN SATURATION: 99 % | SYSTOLIC BLOOD PRESSURE: 172 MMHG | HEIGHT: 64 IN | BODY MASS INDEX: 24.48 KG/M2

## 2019-09-03 DIAGNOSIS — I10 ESSENTIAL HYPERTENSION: Primary | ICD-10-CM

## 2019-09-03 PROCEDURE — 99214 OFFICE O/P EST MOD 30 MIN: CPT | Performed by: FAMILY MEDICINE

## 2019-09-03 RX ORDER — HYDROCHLOROTHIAZIDE 12.5 MG/1
12.5 CAPSULE, GELATIN COATED ORAL DAILY
Qty: 30 CAPSULE | Refills: 1 | Status: SHIPPED | OUTPATIENT
Start: 2019-09-03 | End: 2019-09-26 | Stop reason: SDUPTHER

## 2019-09-03 ASSESSMENT — ENCOUNTER SYMPTOMS
VOICE CHANGE: 0
SINUS PRESSURE: 0
CHOKING: 0
WHEEZING: 0
ANAL BLEEDING: 0
EYE ITCHING: 0
COLOR CHANGE: 0
PHOTOPHOBIA: 0
NAUSEA: 0
CHEST TIGHTNESS: 0
EYE DISCHARGE: 0
FACIAL SWELLING: 0
BLOOD IN STOOL: 0
SHORTNESS OF BREATH: 0
ORTHOPNEA: 0
EYE REDNESS: 0
RECTAL PAIN: 0
BACK PAIN: 0
BLURRED VISION: 0
EYE PAIN: 0
COUGH: 0
VOMITING: 0
CONSTIPATION: 0
TROUBLE SWALLOWING: 0
APNEA: 0
ABDOMINAL PAIN: 0
SORE THROAT: 0

## 2019-09-03 NOTE — PROGRESS NOTES
Subjective:      Patient ID: Rita Yo is a 80 y.o. male. Some stress  bp up  Hypertension   This is a chronic problem. The current episode started more than 1 year ago. The problem is uncontrolled. Pertinent negatives include no anxiety, blurred vision, chest pain, headaches, malaise/fatigue, neck pain, orthopnea, palpitations, peripheral edema, PND, shortness of breath or sweats. There are no associated agents to hypertension. Risk factors for coronary artery disease include male gender. Past treatments include nothing. The current treatment provides no improvement. There are no compliance problems. There is no history of angina, kidney disease, CAD/MI, CVA, heart failure, left ventricular hypertrophy, PVD or retinopathy. There is no history of chronic renal disease, coarctation of the aorta, hyperaldosteronism, hypercortisolism, hyperparathyroidism, a hypertension causing med, pheochromocytoma, renovascular disease, sleep apnea or a thyroid problem. Review of Systems   Constitutional: Negative for activity change, appetite change, chills, diaphoresis, fatigue, fever, malaise/fatigue and unexpected weight change. HENT: Negative for congestion, dental problem, drooling, ear discharge, ear pain, facial swelling, hearing loss, mouth sores, nosebleeds, postnasal drip, sinus pressure, sneezing, sore throat, tinnitus, trouble swallowing and voice change. Eyes: Negative for blurred vision, photophobia, pain, discharge, redness, itching and visual disturbance. Respiratory: Negative for apnea, cough, choking, chest tightness, shortness of breath and wheezing. Cardiovascular: Negative for chest pain, palpitations, orthopnea, leg swelling and PND. Gastrointestinal: Negative for abdominal pain, anal bleeding, blood in stool, constipation, nausea, rectal pain and vomiting.    Genitourinary: Negative for decreased urine volume, difficulty urinating, discharge, dysuria, enuresis, flank pain, frequency,

## 2019-09-26 DIAGNOSIS — I10 ESSENTIAL HYPERTENSION: ICD-10-CM

## 2019-09-27 ENCOUNTER — TELEPHONE (OUTPATIENT)
Dept: PRIMARY CARE CLINIC | Age: 84
End: 2019-09-27

## 2019-09-27 DIAGNOSIS — R19.7 DIARRHEA, UNSPECIFIED TYPE: ICD-10-CM

## 2019-09-27 RX ORDER — HYDROCHLOROTHIAZIDE 12.5 MG/1
CAPSULE, GELATIN COATED ORAL
Qty: 30 CAPSULE | Refills: 1 | Status: SHIPPED | OUTPATIENT
Start: 2019-09-27 | End: 2019-10-21 | Stop reason: SDUPTHER

## 2019-09-27 RX ORDER — LOPERAMIDE HYDROCHLORIDE 2 MG/1
TABLET ORAL
Qty: 30 TABLET | Refills: 1 | Status: ON HOLD | OUTPATIENT
Start: 2019-09-27 | End: 2021-03-24

## 2019-10-21 DIAGNOSIS — I10 ESSENTIAL HYPERTENSION: ICD-10-CM

## 2019-10-21 RX ORDER — HYDROCHLOROTHIAZIDE 12.5 MG/1
CAPSULE, GELATIN COATED ORAL
Qty: 30 CAPSULE | Refills: 1 | Status: ON HOLD | OUTPATIENT
Start: 2019-10-21 | End: 2021-03-24 | Stop reason: ALTCHOICE

## 2020-12-14 ENCOUNTER — TELEPHONE (OUTPATIENT)
Dept: PRIMARY CARE CLINIC | Age: 85
End: 2020-12-14

## 2020-12-14 NOTE — TELEPHONE ENCOUNTER
Pt would like to know if there is something that could be called in to pharmacy for him he says he keeps peeing on hisself

## 2020-12-31 ENCOUNTER — TELEPHONE (OUTPATIENT)
Dept: PRIMARY CARE CLINIC | Age: 85
End: 2020-12-31

## 2021-03-23 ENCOUNTER — HOSPITAL ENCOUNTER (INPATIENT)
Age: 86
LOS: 2 days | Discharge: HOME HEALTH | DRG: 689 | End: 2021-03-25
Attending: EMERGENCY MEDICINE | Admitting: INTERNAL MEDICINE
Payer: MEDICARE

## 2021-03-23 ENCOUNTER — APPOINTMENT (OUTPATIENT)
Dept: CT IMAGING | Age: 86
DRG: 689 | End: 2021-03-23
Payer: MEDICARE

## 2021-03-23 ENCOUNTER — APPOINTMENT (OUTPATIENT)
Dept: GENERAL RADIOLOGY | Age: 86
DRG: 689 | End: 2021-03-23
Payer: MEDICARE

## 2021-03-23 ENCOUNTER — OFFICE VISIT (OUTPATIENT)
Dept: PRIMARY CARE CLINIC | Age: 86
End: 2021-03-23
Payer: OTHER GOVERNMENT

## 2021-03-23 VITALS
SYSTOLIC BLOOD PRESSURE: 123 MMHG | TEMPERATURE: 98.1 F | HEART RATE: 90 BPM | DIASTOLIC BLOOD PRESSURE: 70 MMHG | BODY MASS INDEX: 24.75 KG/M2 | HEIGHT: 64 IN | WEIGHT: 145 LBS

## 2021-03-23 DIAGNOSIS — N30.00 ACUTE CYSTITIS WITHOUT HEMATURIA: ICD-10-CM

## 2021-03-23 DIAGNOSIS — R35.0 FREQUENT URINATION: Primary | ICD-10-CM

## 2021-03-23 DIAGNOSIS — F03.90 PROGRESSIVE DEMENTIA WITH UNCERTAIN ETIOLOGY (HCC): ICD-10-CM

## 2021-03-23 DIAGNOSIS — R41.82 ALTERED MENTAL STATUS, UNSPECIFIED ALTERED MENTAL STATUS TYPE: Primary | ICD-10-CM

## 2021-03-23 DIAGNOSIS — R41.3 MEMORY IMPAIRMENT: ICD-10-CM

## 2021-03-23 PROBLEM — N39.0 UTI (URINARY TRACT INFECTION): Status: ACTIVE | Noted: 2021-03-23

## 2021-03-23 LAB
A/G RATIO: 1.1 (ref 1.1–2.2)
ALBUMIN SERPL-MCNC: 4.4 G/DL (ref 3.4–5)
ALP BLD-CCNC: 95 U/L (ref 40–129)
ALT SERPL-CCNC: 10 U/L (ref 10–40)
ANION GAP SERPL CALCULATED.3IONS-SCNC: 11 MMOL/L (ref 3–16)
AST SERPL-CCNC: 17 U/L (ref 15–37)
BACTERIA: ABNORMAL /HPF
BASOPHILS ABSOLUTE: 0 K/UL (ref 0–0.2)
BASOPHILS RELATIVE PERCENT: 0.6 %
BILIRUB SERPL-MCNC: 0.8 MG/DL (ref 0–1)
BILIRUBIN URINE: NEGATIVE
BLOOD, URINE: ABNORMAL
BUN BLDV-MCNC: 25 MG/DL (ref 7–20)
CALCIUM SERPL-MCNC: 9.4 MG/DL (ref 8.3–10.6)
CHLORIDE BLD-SCNC: 108 MMOL/L (ref 99–110)
CLARITY: ABNORMAL
CO2: 21 MMOL/L (ref 21–32)
COLOR: YELLOW
COMMENT UA: ABNORMAL
CREAT SERPL-MCNC: 1.2 MG/DL (ref 0.8–1.3)
EOSINOPHILS ABSOLUTE: 0.2 K/UL (ref 0–0.6)
EOSINOPHILS RELATIVE PERCENT: 2.2 %
EPITHELIAL CELLS, UA: 1 /HPF (ref 0–5)
GFR AFRICAN AMERICAN: >60
GFR NON-AFRICAN AMERICAN: 57
GLOBULIN: 4 G/DL
GLUCOSE BLD-MCNC: 104 MG/DL (ref 70–99)
GLUCOSE BLD-MCNC: 97 MG/DL (ref 70–99)
GLUCOSE URINE: NEGATIVE MG/DL
HCT VFR BLD CALC: 40.6 % (ref 40.5–52.5)
HEMOGLOBIN: 13.3 G/DL (ref 13.5–17.5)
HYALINE CASTS: 3 /LPF (ref 0–8)
KETONES, URINE: NEGATIVE MG/DL
LEUKOCYTE ESTERASE, URINE: ABNORMAL
LYMPHOCYTES ABSOLUTE: 1.7 K/UL (ref 1–5.1)
LYMPHOCYTES RELATIVE PERCENT: 21.2 %
MCH RBC QN AUTO: 26.2 PG (ref 26–34)
MCHC RBC AUTO-ENTMCNC: 32.7 G/DL (ref 31–36)
MCV RBC AUTO: 80.1 FL (ref 80–100)
MICROSCOPIC EXAMINATION: YES
MONOCYTES ABSOLUTE: 0.5 K/UL (ref 0–1.3)
MONOCYTES RELATIVE PERCENT: 6.3 %
NEUTROPHILS ABSOLUTE: 5.5 K/UL (ref 1.7–7.7)
NEUTROPHILS RELATIVE PERCENT: 69.7 %
NITRITE, URINE: POSITIVE
PDW BLD-RTO: 16 % (ref 12.4–15.4)
PERFORMED ON: NORMAL
PH UA: 5.5 (ref 5–8)
PLATELET # BLD: 265 K/UL (ref 135–450)
PMV BLD AUTO: 7.8 FL (ref 5–10.5)
POTASSIUM REFLEX MAGNESIUM: 4.2 MMOL/L (ref 3.5–5.1)
PROTEIN UA: NEGATIVE MG/DL
RBC # BLD: 5.07 M/UL (ref 4.2–5.9)
RBC UA: 2 /HPF (ref 0–4)
SODIUM BLD-SCNC: 140 MMOL/L (ref 136–145)
SPECIFIC GRAVITY UA: 1.01 (ref 1–1.03)
TOTAL PROTEIN: 8.4 G/DL (ref 6.4–8.2)
URINE REFLEX TO CULTURE: YES
URINE TYPE: ABNORMAL
UROBILINOGEN, URINE: 0.2 E.U./DL
WBC # BLD: 7.9 K/UL (ref 4–11)
WBC UA: 48 /HPF (ref 0–5)

## 2021-03-23 PROCEDURE — 81001 URINALYSIS AUTO W/SCOPE: CPT

## 2021-03-23 PROCEDURE — 80053 COMPREHEN METABOLIC PANEL: CPT

## 2021-03-23 PROCEDURE — 93005 ELECTROCARDIOGRAM TRACING: CPT | Performed by: EMERGENCY MEDICINE

## 2021-03-23 PROCEDURE — 6360000002 HC RX W HCPCS: Performed by: EMERGENCY MEDICINE

## 2021-03-23 PROCEDURE — 99213 OFFICE O/P EST LOW 20 MIN: CPT | Performed by: NURSE PRACTITIONER

## 2021-03-23 PROCEDURE — 87040 BLOOD CULTURE FOR BACTERIA: CPT

## 2021-03-23 PROCEDURE — 2580000003 HC RX 258: Performed by: EMERGENCY MEDICINE

## 2021-03-23 PROCEDURE — 36415 COLL VENOUS BLD VENIPUNCTURE: CPT

## 2021-03-23 PROCEDURE — 70450 CT HEAD/BRAIN W/O DYE: CPT

## 2021-03-23 PROCEDURE — 85025 COMPLETE CBC W/AUTO DIFF WBC: CPT

## 2021-03-23 PROCEDURE — 1200000000 HC SEMI PRIVATE

## 2021-03-23 PROCEDURE — 87086 URINE CULTURE/COLONY COUNT: CPT

## 2021-03-23 PROCEDURE — 99284 EMERGENCY DEPT VISIT MOD MDM: CPT

## 2021-03-23 PROCEDURE — 6370000000 HC RX 637 (ALT 250 FOR IP): Performed by: INTERNAL MEDICINE

## 2021-03-23 PROCEDURE — 2580000003 HC RX 258: Performed by: INTERNAL MEDICINE

## 2021-03-23 PROCEDURE — 6360000002 HC RX W HCPCS: Performed by: INTERNAL MEDICINE

## 2021-03-23 PROCEDURE — 71046 X-RAY EXAM CHEST 2 VIEWS: CPT

## 2021-03-23 RX ORDER — ONDANSETRON 2 MG/ML
4 INJECTION INTRAMUSCULAR; INTRAVENOUS EVERY 6 HOURS PRN
Status: DISCONTINUED | OUTPATIENT
Start: 2021-03-23 | End: 2021-03-25 | Stop reason: HOSPADM

## 2021-03-23 RX ORDER — SODIUM CHLORIDE 0.9 % (FLUSH) 0.9 %
10 SYRINGE (ML) INJECTION PRN
Status: DISCONTINUED | OUTPATIENT
Start: 2021-03-23 | End: 2021-03-25 | Stop reason: HOSPADM

## 2021-03-23 RX ORDER — LATANOPROST 50 UG/ML
1 SOLUTION/ DROPS OPHTHALMIC NIGHTLY
Status: DISCONTINUED | OUTPATIENT
Start: 2021-03-23 | End: 2021-03-25 | Stop reason: HOSPADM

## 2021-03-23 RX ORDER — HYDROCHLOROTHIAZIDE 12.5 MG/1
12.5 CAPSULE, GELATIN COATED ORAL DAILY
Status: DISCONTINUED | OUTPATIENT
Start: 2021-03-23 | End: 2021-03-24

## 2021-03-23 RX ORDER — ACETAMINOPHEN 325 MG/1
650 TABLET ORAL EVERY 6 HOURS PRN
Status: DISCONTINUED | OUTPATIENT
Start: 2021-03-23 | End: 2021-03-25 | Stop reason: HOSPADM

## 2021-03-23 RX ORDER — PROMETHAZINE HYDROCHLORIDE 25 MG/1
12.5 TABLET ORAL EVERY 6 HOURS PRN
Status: DISCONTINUED | OUTPATIENT
Start: 2021-03-23 | End: 2021-03-25 | Stop reason: HOSPADM

## 2021-03-23 RX ORDER — ACETAMINOPHEN 650 MG/1
650 SUPPOSITORY RECTAL EVERY 6 HOURS PRN
Status: DISCONTINUED | OUTPATIENT
Start: 2021-03-23 | End: 2021-03-25 | Stop reason: HOSPADM

## 2021-03-23 RX ORDER — POTASSIUM CHLORIDE 7.45 MG/ML
10 INJECTION INTRAVENOUS PRN
Status: DISCONTINUED | OUTPATIENT
Start: 2021-03-23 | End: 2021-03-25 | Stop reason: HOSPADM

## 2021-03-23 RX ORDER — SODIUM CHLORIDE 0.9 % (FLUSH) 0.9 %
10 SYRINGE (ML) INJECTION EVERY 12 HOURS SCHEDULED
Status: DISCONTINUED | OUTPATIENT
Start: 2021-03-23 | End: 2021-03-25 | Stop reason: HOSPADM

## 2021-03-23 RX ORDER — MAGNESIUM SULFATE IN WATER 40 MG/ML
2000 INJECTION, SOLUTION INTRAVENOUS PRN
Status: DISCONTINUED | OUTPATIENT
Start: 2021-03-23 | End: 2021-03-25 | Stop reason: HOSPADM

## 2021-03-23 RX ADMIN — CEFTRIAXONE 1000 MG: 1 INJECTION, POWDER, FOR SOLUTION INTRAMUSCULAR; INTRAVENOUS at 22:10

## 2021-03-23 RX ADMIN — LATANOPROST 1 DROP: 50 SOLUTION OPHTHALMIC at 23:35

## 2021-03-23 RX ADMIN — SODIUM CHLORIDE, PRESERVATIVE FREE 10 ML: 5 INJECTION INTRAVENOUS at 22:10

## 2021-03-23 RX ADMIN — CEFEPIME HYDROCHLORIDE 2000 MG: 2 INJECTION, POWDER, FOR SOLUTION INTRAVENOUS at 18:23

## 2021-03-23 SDOH — ECONOMIC STABILITY: TRANSPORTATION INSECURITY
IN THE PAST 12 MONTHS, HAS LACK OF TRANSPORTATION KEPT YOU FROM MEETINGS, WORK, OR FROM GETTING THINGS NEEDED FOR DAILY LIVING?: NOT ASKED

## 2021-03-23 SDOH — ECONOMIC STABILITY: FOOD INSECURITY: WITHIN THE PAST 12 MONTHS, YOU WORRIED THAT YOUR FOOD WOULD RUN OUT BEFORE YOU GOT MONEY TO BUY MORE.: NOT ASKED

## 2021-03-23 SDOH — ECONOMIC STABILITY: INCOME INSECURITY: HOW HARD IS IT FOR YOU TO PAY FOR THE VERY BASICS LIKE FOOD, HOUSING, MEDICAL CARE, AND HEATING?: NOT HARD AT ALL

## 2021-03-23 SDOH — ECONOMIC STABILITY: TRANSPORTATION INSECURITY
IN THE PAST 12 MONTHS, HAS THE LACK OF TRANSPORTATION KEPT YOU FROM MEDICAL APPOINTMENTS OR FROM GETTING MEDICATIONS?: NOT ASKED

## 2021-03-23 ASSESSMENT — ENCOUNTER SYMPTOMS
COUGH: 0
CONSTIPATION: 0
CHOKING: 0
COUGH: 0
STRIDOR: 0
PHOTOPHOBIA: 0
CHEST TIGHTNESS: 0
VOMITING: 0
NAUSEA: 0
NAUSEA: 0
SHORTNESS OF BREATH: 0
DIARRHEA: 0
SINUS PAIN: 0
ABDOMINAL PAIN: 0
SORE THROAT: 0
TROUBLE SWALLOWING: 0
CONSTIPATION: 0
SHORTNESS OF BREATH: 0
WHEEZING: 0
DIARRHEA: 0
EYE PAIN: 0
VOMITING: 0

## 2021-03-23 ASSESSMENT — PAIN SCALES - GENERAL: PAINLEVEL_OUTOF10: 0

## 2021-03-23 NOTE — PROGRESS NOTES
Subjective     CC:   Chief Complaint   Patient presents with    Urinary Tract Infection     rule out    Altered Mental Status       HPI    Ant Phelan is a 79 yo male, patient of Dr Sugey Fuentes. Last seen 9/2019. Tells me he is here because he is having frequent urination. Reports this started about 6 months ago. Has not seen any blood in his urine. Has been wearing depends because he feels he is constantly leaking urine. Denies dysuria. Denies fever/chills. Noted he called in December with same complaints, was advised to make appointment and did not. He is oriented x4 today. He comes to this appointment alone. Tells me he is not currently taking any medications including his blood pressure medication. Tells me it has been a \"pretty good while\" since he take any medication. He is not sure why he is not taking his blood pressure medication. His daughter, Haim Higgins lives in [place], and she was involved in visit via phone today. She reports that he told her that his urinary complaints began today and therefore she made this appointment for him due to that. She was unaware that he has not been seen since 9/2019 by PCP. She reports that over the last several months she has noticed that he has been calling repeating information to her several times a day and has began sending packages to the wrong address. Reports he lives in an independent living apartment and she was considering to hire a nurse to start checking in on him but he currently does not have any services. .    Review of Systems   Constitutional: Negative for chills, fatigue and fever. Respiratory: Negative for cough and shortness of breath. Cardiovascular: Negative for chest pain and leg swelling. Gastrointestinal: Negative for constipation, diarrhea, nausea and vomiting. Genitourinary: Positive for frequency and urgency. Negative for difficulty urinating, dysuria and flank pain. Neurological: Negative for dizziness and headaches. Psychiatric/Behavioral: Positive for confusion. Negative for sleep disturbance. Objective   Vitals:    03/23/21 1506   BP: 123/70   Pulse: 90   Temp: 98.1 °F (36.7 °C)   TempSrc: Temporal   Weight: 145 lb (65.8 kg)   Height: 5' 4\" (1.626 m)     Body mass index is 24.89 kg/m². Wt Readings from Last 3 Encounters:   03/23/21 145 lb (65.8 kg)   09/03/19 143 lb 6.4 oz (65 kg)   06/03/19 145 lb 12.8 oz (66.1 kg)     BP Readings from Last 3 Encounters:   03/23/21 123/70   09/03/19 (!) 172/94   06/03/19 (!) 140/80      Physical Exam  Constitutional:       General: He is not in acute distress. Appearance: Normal appearance. HENT:      Head: Normocephalic and atraumatic. Pulmonary:      Effort: Pulmonary effort is normal. No respiratory distress. Skin:     General: Skin is warm and dry. Neurological:      General: No focal deficit present. Mental Status: He is alert and oriented to person, place, and time. Psychiatric:         Mood and Affect: Mood normal.         Behavior: Behavior normal.          Diagnosis Orders   1. Frequent urination     2. Memory impairment             Plan   1. Frequent urination: He was unable to provide urine sample for us in office today. He will need work-up including urinalysis and PSA. Noted he called the office about this in December as well and was instructed to make appointment that he did not follow through with. 2. Memory impairment: Patient discussed with his PCP he reports he has chronic memory impairment. He is oriented x4 today however long-term memory impaired. He does need thorough work-up including labs. However patient unfortunately has no family members or friends that are able to help transport him. Attempted to call his independent living facility as his daughter feels that they may be able to help with transportation however I was unable to reach anyone.   Daughter expressed concern that he gets lost when driving unless it is to his apartment into our office or other places he usually frequents. Do not feel that he is safe to drive at this time with his impaired memory. EMS transport to emergency department for social and medical evaluation. No follow-ups on file. OR sooner with questions, concerns, worsening symptoms      -Patient verbalized understanding and agreement to plan. Please note that this chart was generated using dragon dictation software. Although every effort was made to ensure the accuracy of this automated transcription, some errors in transcription may have occurred.

## 2021-03-23 NOTE — ED NOTES

## 2021-03-23 NOTE — CARE COORDINATION
INITIAL CASE MANAGEMENT ASSESSMENT    Reviewed chart, met with patient to assess possible discharge needs. Explained Case Management role/services. Living Situation: Lives alone in senior building. ADLs: Independent     DME: none    PT/OT Recs: TBD     Active Services: none     Transportation: Active      Medications: not sure    PCP: Florentino Tom MD      HD/PD: N/A    PLAN/COMMENTS:   Patient has one daughter Luma Agustin [phone], she lives in [place]. Called [facility], she reports that her father has been becoming more forgetful. SW emailed SNF list to [email or web address])- daughter advised that patient's brother and sister have both recently been placed in facilities due to dementia. She is unsure of facilities and will check with cousins. Discussed that patient may have UTI that would cause confusion. SW following for D/C needs. SW/CM provided contact information for patient or family to call with any questions. SW/CM will follow and assist as needed.

## 2021-03-23 NOTE — ED NOTES
Bed: A-17  Expected date:   Expected time:   Means of arrival: [facility]  Comments:  Stefany Parra RN  03/23/21 6205

## 2021-03-23 NOTE — ED PROVIDER NOTES
eMERGENCY dEPARTMENT eNCOUnter      Pt Name: Carmelina Quinn  MRN: [id number]  [place] 1934  Date of evaluation: 3/23/2021  Provider: Louis Pallas, MD     CHIEF COMPLAINT       Chief Complaint   Patient presents with    Altered Mental Status     increased forgetfullness the past couple of days (yesterday went for a drive and got turn and didnt know were he was, today he drove to PCP office and left car running), daughter wanted him evaluated. pt A&Ox4 upon arrival    Urinary Frequency     was see PCP today for issue         HISTORY OF PRESENT ILLNESS   (Location/Symptom, Timing/Onset,Context/Setting, Quality, Duration, Modifying Factors, Severity) Note limiting factors. Carmelina Quinn is a 80 y.o. male who presents to the emergency department from his PCP office via EMS for increased confusion and urinary incontinence. HPI and ROS limited by pt AMS. Family lives in [place] and he has no family here. The patient is primarily concerned about his increased urinary incontinence. No triggers such as laughing coughing or bearing down. Has been wearing depends over last several months. Says this has been an issue 6 months to a year but is a poor historian. He denies any symptoms including hematuria, pain, SOB, dizziness. He is only able to do 1 serial 7. Failed 3 word recall. Oriented to person, place and time. REVIEW OFSYSTEMS    (2+ for level 4; 10+ for level 5)   Review of Systems   Constitutional: Negative for chills and fever. HENT: Negative for ear pain, sinus pain, sore throat and trouble swallowing. Eyes: Negative for photophobia and pain. Respiratory: Negative for cough, choking, chest tightness, shortness of breath, wheezing and stridor. Cardiovascular: Negative for chest pain, palpitations and leg swelling. Gastrointestinal: Negative for abdominal pain, constipation, diarrhea, nausea and vomiting. Genitourinary: Positive for frequency and urgency.    Musculoskeletal: Negative. Skin: Negative. Neurological: Negative for dizziness, seizures, speech difficulty, numbness and headaches. Psychiatric/Behavioral: Positive for confusion. All other systems reviewed and are negative. PAST MEDICAL HISTORY   History reviewed. No pertinent past medical history. SURGICAL HISTORY       Past Surgical History:   Procedure Laterality Date    CATARACT REMOVAL      GLAUCOMA SURGERY         CURRENT MEDICATIONS       Current Discharge Medication List      CONTINUE these medications which have NOT CHANGED    Details   hydrochlorothiazide (MICROZIDE) 12.5 MG capsule TAKE 1 CAPSULE BY MOUTH EVERY DAY  Qty: 30 capsule, Refills: 1    Associated Diagnoses: Essential hypertension      loperamide (IMODIUM A-D) 2 MG tablet Take two tablets initially, then 1 tablet after every unformed stool. Maximum 8 tablets per day. Qty: 30 tablet, Refills: 1    Associated Diagnoses: Diarrhea, unspecified type      acetaminophen (TYLENOL) 500 MG tablet Take 1 tablet by mouth 4 times daily as needed for Pain  Qty: 360 tablet, Refills: 1    Associated Diagnoses: Spinal stenosis, unspecified spinal region      BESIVANCE 0.6 % SUSP       COMBIGAN 0.2-0.5 % ophthalmic solution       dorzolamide (TRUSOPT) 2 % ophthalmic solution       fluorometholone (FML) 0.1 % ophthalmic suspension       latanoprost (XALATAN) 0.005 % ophthalmic solution       PRED FORTE 1 % ophthalmic suspension              ALLERGIES     Patient has no known allergies.     FAMILY HISTORY       Family History   Problem Relation Age of Onset    Glaucoma Mother     Glaucoma Father         SOCIAL HISTORY       Social History     Socioeconomic History    Marital status: Single     Spouse name: None    Number of children: None    Years of education: None    Highest education level: None   Occupational History    None   Social Needs    Financial resource strain: Not hard at all   [address] insecurity     Worry: None     Inability: None    Transportation needs     Medical: None     Non-medical: None   Tobacco Use    Smoking status: Never Smoker    Smokeless tobacco: Never Used   Substance and Sexual Activity    Alcohol use: No    Drug use: No    Sexual activity: None   Lifestyle    Physical activity     Days per week: None     Minutes per session: None    Stress: None   Relationships    Social connections     Talks on phone: None     Gets together: None     Attends Gnosticism service: None     Active member of club or organization: None     Attends meetings of clubs or organizations: None     Relationship status: None    Intimate partner violence     Fear of current or ex partner: None     Emotionally abused: None     Physically abused: None     Forced sexual activity: None   Other Topics Concern    None   Social History Narrative    None       SCREENINGS    [place] Coma Scale  Eye Opening: Spontaneous  Best Verbal Response: Oriented  Best Motor Response: Obeys commands  Abdi Coma Scale Score: 15      PHYSICAL EXAM    (up to 7 for level 4, 8 or more for level 5)     ED Triage Vitals [03/23/21 1640]   BP Temp Temp Source Pulse Resp SpO2 Height Weight   (!) 155/82 99.1 °F (37.3 °C) Oral 94 17 100 % -- --       Physical Exam  Vitals signs and nursing note reviewed. Constitutional:       General: He is not in acute distress. Appearance: He is normal weight. He is not ill-appearing, toxic-appearing or diaphoretic. HENT:      Head: Normocephalic and atraumatic. Mouth/Throat:      Mouth: Mucous membranes are moist.      Pharynx: Oropharynx is clear. Eyes:      General: No scleral icterus. Extraocular Movements: Extraocular movements intact. Pupils: Pupils are equal, round, and reactive to light. Neck:      Musculoskeletal: Normal range of motion and neck supple. Cardiovascular:      Rate and Rhythm: Normal rate and regular rhythm. Heart sounds: Normal heart sounds. No murmur. No friction rub. No gallop. reconstruction, and/or weight based adjustment of the mA/kV was utilized to reduce the radiation dose to as low as reasonably achievable. COMPARISON: None. HISTORY: ORDERING SYSTEM PROVIDED HISTORY: AMS TECHNOLOGIST PROVIDED HISTORY: Reason for exam:->AMS Has a \"code stroke\" or \"stroke alert\" been called? ->No Decision Support Exception->Emergency Medical Condition (MA) Reason for Exam: Altered Mental Status; Urinary Frequency Acuity: Acute Type of Exam: Initial FINDINGS: BRAIN/VENTRICLES: The ventricles are mildly enlarged and there is diffuse mild prominence of the cortical sulci. There is moderate periventricular low density bilaterally. No intracranial hemorrhage or edema is seen. There is mild cortical atrophy along the left temporal lobe anterior laterally. There is no extra-axial fluid collection or mass. The midline structures unremarkable. ORBITS: The visualized portion of the orbits demonstrate no acute abnormality. SINUSES: There is moderate mucosal thickening throughout the maxillary sinuses. There are no air-fluid levels. The mastoid air cells demonstrate no acute abnormality. SOFT TISSUES/SKULL:  No acute abnormality of the visualized skull or soft tissues. 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 Mild atrophy and moderate chronic microischemic disease throughout the deep white matter with no acute abnormality seen. Moderate chronic maxillary sinusitis.        ED BEDSIDE ULTRASOUND:   Performed by ED Physician - none    LABS:  Labs Reviewed   URINE RT REFLEX TO CULTURE - Abnormal; Notable for the following components:       Result Value    Clarity, UA CLOUDY (*)     Blood, Urine TRACE (*)     Nitrite, Urine POSITIVE (*)     Leukocyte Esterase, Urine SMALL (*)     All other components within normal limits    Narrative:     Performed at:  [address] [address]   Phone [phone]   CBC WITH AUTO DIFFERENTIAL - Abnormal; Notable for the following components:    Hemoglobin 13.3 (*)     RDW 16.0 (*)     All other components within normal limits    Narrative:     Performed at:  [place] [address] [place] [facility] [employer]   Phone [phone]   COMPREHENSIVE METABOLIC PANEL W/ REFLEX TO MG FOR LOW K - Abnormal; Notable for the following components:    Glucose 104 (*)     BUN 25 (*)     GFR Non- 57 (*)     Total Protein 8.4 (*)     All other components within normal limits    Narrative:     Performed at:  [address] [facility] [employer] [address]   Phone [phone]   MICROSCOPIC URINALYSIS - Abnormal; Notable for the following components:    Bacteria, UA 4+ (*)     WBC, UA 48 (*)     All other components within normal limits    Narrative:     Performed at:  [address] [facility] [employer]   Phone [phone]   CULTURE, URINE   CULTURE, BLOOD 1   CULTURE, BLOOD 2   BASIC METABOLIC PANEL W/ REFLEX TO MG FOR LOW K   CBC   POCT GLUCOSE    Narrative:     Performed at:  [address] [employer]   Phone [phone]   POCT GLUCOSE        All other labs were within normal range or not returned as of this dictation.       Procedures      EMERGENCY DEPARTMENT COURSE and DIFFERENTIAL DIAGNOSIS/MDM:   Vitals:    Vitals:    03/23/21 1842 03/23/21 1944 03/23/21 1945 03/23/21 2019   BP: 124/68 118/72 (!) 141/64 125/74   Pulse: 79 72  78   Resp:  16  18   Temp:  99 °F (37.2 °C)  98 °F (36.7 °C)   TempSrc:  Oral  Oral   SpO2: 100% 100%  95%   Weight:    143 lb 4.8 oz (65 kg)   Height:    5' 4\" (1.626 m)       Medications   sodium chloride flush 0.9 % injection 10 mL (has no administration in time range)   sodium chloride flush 0.9 % injection 10 mL (has no administration in time range) potassium chloride 10 mEq/100 mL IVPB (Peripheral Line) (has no administration in time range)   magnesium sulfate 2000 mg in 50 mL IVPB premix (has no administration in time range)   enoxaparin (LOVENOX) injection 40 mg (has no administration in time range)   promethazine (PHENERGAN) tablet 12.5 mg (has no administration in time range)     Or   ondansetron (ZOFRAN) injection 4 mg (has no administration in time range)   magnesium hydroxide (MILK OF MAGNESIA) 400 MG/5ML suspension 30 mL (has no administration in time range)   acetaminophen (TYLENOL) tablet 650 mg (has no administration in time range)     Or   acetaminophen (TYLENOL) suppository 650 mg (has no administration in time range)   hydroCHLOROthiazide (MICROZIDE) capsule 12.5 mg (has no administration in time range)   latanoprost (XALATAN) 0.005 % ophthalmic solution 1 drop (has no administration in time range)   cefTRIAXone (ROCEPHIN) 1000 mg IVPB in 50 mL D5W minibag (has no administration in time range)   cefepime (MAXIPIME) 2000 mg IVPB minibag (0 mg Intravenous Stopped 3/23/21 1945)       MDM. Increased frequency. Denies any pain. Daughter states that I want him evaluated patient is alert and awake x4 but has been having forgetful and memory loss. Patient is exam neurologically is intact. He does have a urinary tract infection. Will start antibiotics for suspect may be some sepsis with altered mental status change. Was discussed with hospitalist for admission. Also consult hospitalist service for possible placement and evaluation if he can still drive on his own. REVAL:         CRITICAL CARE TIME   Total CriticalCare time was 0 minutes, excluding separately reportable procedures. There was a high probability of clinically significant/life threatening deterioration in the patient's condition which required my urgent intervention.      CONSULTS:  IP CONSULT TO PHARMACY    PROCEDURES:  Unless otherwise noted below, none @[facility]@    FINAL IMPRESSION      1. Altered mental status, unspecified altered mental status type    2. Acute cystitis without hematuria    3. Progressive dementia with uncertain etiology [facility])          DISPOSITION/PLAN   DISPOSITION Admitted 03/23/2021 06:13:52 PM      PATIENT REFERRED TO:  No follow-up provider specified. DISCHARGE MEDICATIONS:  Current Discharge Medication List             (Please note:  Portions of this note were completed with a voice recognition program.Efforts were made to edit the dictations but occasionally words and phrases are mis-transcribed.)  Form v2016. J.[id number]    Siva LOCKETT MD (electronically signed)  Emergency Medicine Provider        Anjelica Hood MD  03/23/21 181       Anjelica Hood MD  03/23/21 2200

## 2021-03-24 LAB
ANION GAP SERPL CALCULATED.3IONS-SCNC: 12 MMOL/L (ref 3–16)
BUN BLDV-MCNC: 27 MG/DL (ref 7–20)
CALCIUM SERPL-MCNC: 9.1 MG/DL (ref 8.3–10.6)
CHLORIDE BLD-SCNC: 111 MMOL/L (ref 99–110)
CO2: 20 MMOL/L (ref 21–32)
CREAT SERPL-MCNC: 1.3 MG/DL (ref 0.8–1.3)
EKG ATRIAL RATE: 78 BPM
EKG DIAGNOSIS: NORMAL
EKG P AXIS: 27 DEGREES
EKG P-R INTERVAL: 160 MS
EKG Q-T INTERVAL: 376 MS
EKG QRS DURATION: 114 MS
EKG QTC CALCULATION (BAZETT): 428 MS
EKG R AXIS: -44 DEGREES
EKG T AXIS: 69 DEGREES
EKG VENTRICULAR RATE: 78 BPM
GFR AFRICAN AMERICAN: >60
GFR NON-AFRICAN AMERICAN: 52
GLUCOSE BLD-MCNC: 106 MG/DL (ref 70–99)
HCT VFR BLD CALC: 38 % (ref 40.5–52.5)
HEMOGLOBIN: 12.5 G/DL (ref 13.5–17.5)
MCH RBC QN AUTO: 26.5 PG (ref 26–34)
MCHC RBC AUTO-ENTMCNC: 33 G/DL (ref 31–36)
MCV RBC AUTO: 80.4 FL (ref 80–100)
PDW BLD-RTO: 15.8 % (ref 12.4–15.4)
PLATELET # BLD: 233 K/UL (ref 135–450)
PMV BLD AUTO: 7.3 FL (ref 5–10.5)
POTASSIUM REFLEX MAGNESIUM: 4.2 MMOL/L (ref 3.5–5.1)
RBC # BLD: 4.72 M/UL (ref 4.2–5.9)
SODIUM BLD-SCNC: 143 MMOL/L (ref 136–145)
URINE CULTURE, ROUTINE: NORMAL
WBC # BLD: 6.3 K/UL (ref 4–11)

## 2021-03-24 PROCEDURE — 80048 BASIC METABOLIC PNL TOTAL CA: CPT

## 2021-03-24 PROCEDURE — 97530 THERAPEUTIC ACTIVITIES: CPT

## 2021-03-24 PROCEDURE — 94761 N-INVAS EAR/PLS OXIMETRY MLT: CPT

## 2021-03-24 PROCEDURE — 93010 ELECTROCARDIOGRAM REPORT: CPT | Performed by: INTERNAL MEDICINE

## 2021-03-24 PROCEDURE — 6360000002 HC RX W HCPCS: Performed by: INTERNAL MEDICINE

## 2021-03-24 PROCEDURE — 1200000000 HC SEMI PRIVATE

## 2021-03-24 PROCEDURE — 2580000003 HC RX 258: Performed by: INTERNAL MEDICINE

## 2021-03-24 PROCEDURE — 85027 COMPLETE CBC AUTOMATED: CPT

## 2021-03-24 PROCEDURE — 97535 SELF CARE MNGMENT TRAINING: CPT

## 2021-03-24 PROCEDURE — 36415 COLL VENOUS BLD VENIPUNCTURE: CPT

## 2021-03-24 PROCEDURE — 97162 PT EVAL MOD COMPLEX 30 MIN: CPT

## 2021-03-24 PROCEDURE — 97166 OT EVAL MOD COMPLEX 45 MIN: CPT

## 2021-03-24 RX ADMIN — SODIUM CHLORIDE, PRESERVATIVE FREE 10 ML: 5 INJECTION INTRAVENOUS at 21:13

## 2021-03-24 RX ADMIN — SODIUM CHLORIDE, PRESERVATIVE FREE 10 ML: 5 INJECTION INTRAVENOUS at 08:28

## 2021-03-24 RX ADMIN — ENOXAPARIN SODIUM 40 MG: 40 INJECTION SUBCUTANEOUS at 08:28

## 2021-03-24 RX ADMIN — LATANOPROST 1 DROP: 50 SOLUTION OPHTHALMIC at 21:13

## 2021-03-24 RX ADMIN — CEFTRIAXONE 1000 MG: 1 INJECTION, POWDER, FOR SOLUTION INTRAMUSCULAR; INTRAVENOUS at 21:15

## 2021-03-24 NOTE — PROGRESS NOTES
Hospital Medicine Progress Note      Admit Date: 3/23/2021       CC: F/U for frequent urination and burning sensation    HPI: Patient is a 19-year-old male with past medical history of hypertension who presents to the hospital due to concern of altered mental status from home. Patient did show up to the emergency department at the request of patient's daughter. Patient mentions he has been having frequency urination and burning sensation otherwise denies chest pain shortness of breath nausea vomiting diarrhea constipation. According to the call received by EMS patient was found confused and was recommended to come to the emergency department.       Interval History/Subjective: patient very anxious. Sitting in chair. As soon as I enter, he starts going on about how no one has fed him, no one is doing anything, he is ready to get out of here. He needs me to go get his cell phone. Explained who I was and what brought him in. States \"I was urinating all the time. \"  Explained he is getting antibiotics. He states again, \"I want to get out of here. You all aren't doing anything for me. \"     cont antibiotics. Watch cx. Spoke to nurse who has contacted daughter multiple times today. She missed her flight to get here. She says they want him placed in a facility on discharge due to his worsening confusion/dementia lately. Social work is involved. Review of Systems:     The patient denied headaches, visual changes, LOC, SOB, CP, ABD pain, N/V/D, skin changes, new or worsening weakness or neuromuscular deficits. Comprehensive ROS negative except as mentioned above. Past Medical History:    History reviewed. No pertinent past medical history. Past Surgical History:        Procedure Laterality Date    CATARACT REMOVAL      GLAUCOMA SURGERY         Allergies:  Patient has no known allergies. Past medical and surgical history reviewed. Any changes have been noted.      PHYSICAL EXAM:  /71 Pulse 72   Temp 98.5 °F (36.9 °C) (Oral)   Resp 16   Ht 5' 4\" (1.626 m)   Wt 143 lb 4.8 oz (65 kg)   SpO2 97%   BMI 24.60 kg/m²       Intake/Output Summary (Last 24 hours) at 3/24/2021 0843  Last data filed at 3/24/2021 0644  Gross per 24 hour   Intake 170 ml   Output 200 ml   Net -30 ml        General appearance:   Very anxious and agitated,  appears stated age. Cooperative. HEENT:  Normocephalic, atraumatic. PERRLA. EOMi. Conjunctivae/corneas clear, no icterus, non-injected. Neck: Supple, with full range of motion. No jugular venous distention. Trachea midline. Respiratory:  Normal respiratory effort. Clear to auscultation, bilaterally without Rales/Wheezes/Rhonchi. Cardiovascular:  Regular rate and rhythm without murmurs, rubs or gallops. Abdomen: Soft, non-tender, non-distended, without rebound or guarding. Normal bowel sounds. Musculoskeletal:  No clubbing, cyanosis or edema bilaterally. Full range of motion without deformity. Skin: Skin color, texture, turgor normal.  No rashes or lesions. Neurologic:  Neurovascularly intact without any focal sensory/motor deficits. Cranial nerves: II-XII intact, grossly intact. No facial asymmetry, tongue midline.    Psychiatric:  Alert, oriented to self, confused, repeating himself, anxious and aggravated  Capillary Refill: Brisk,< 3 seconds   Peripheral Pulses: +2 palpable, equal bilaterally       LABS:    Lab Results   Component Value Date    WBC 6.3 03/24/2021    HGB 12.5 (L) 03/24/2021    HCT 38.0 (L) 03/24/2021    MCV 80.4 03/24/2021     03/24/2021    LYMPHOPCT 21.2 03/23/2021    RBC 4.72 03/24/2021    MCH 26.5 03/24/2021    MCHC 33.0 03/24/2021    RDW 15.8 (H) 03/24/2021       Lab Results   Component Value Date    CREATININE 1.2 03/23/2021    BUN 25 (H) 03/23/2021     03/23/2021    K 4.2 03/23/2021     03/23/2021    CO2 21 03/23/2021       No results found for: MG    Lab Results   Component Value Date    ALT 10 03/23/2021    AST 17 the family were informed of the results of any tests, a time was given to answer questions, a plan was proposed and they agreed with plan. DVT ppx: lovenox      Diet: DIET GENERAL;    Consults:  None    DISPO/placement plan: pending.  To new facility when able    Code Status: Full Code      TOÑA Barrera CNP  03/24/21

## 2021-03-24 NOTE — PROGRESS NOTES
Pt arrived to [phone] from ED via wheelchair. Pt is A/O x 3, but is forgetful. VSS. Pt denies any c/o pain at this time. Skin assessment completed. Please see 4 eyes skin note and skin documentation. Oriented to room, call light and hourly rounding. Will review orders and continue to monitor.      Gregg King RN 3/23/2021 8:18 PM

## 2021-03-24 NOTE — PLAN OF CARE
Problem: Falls - Risk of:  Goal: Will remain free from falls  Description: Will remain free from falls  3/24/2021 1309 by Rose Ennis RN  Outcome: Ongoing  3/24/2021 0036 by Nathan Matute RN  Outcome: Ongoing  Goal: Absence of physical injury  Description: Absence of physical injury  3/24/2021 1309 by Rose Ennis RN  Outcome: Ongoing  3/24/2021 0036 by Nathan Matute RN  Outcome: Ongoing     Problem: Skin Integrity:  Goal: Will show no infection signs and symptoms  Description: Will show no infection signs and symptoms  3/24/2021 1309 by Rose Ennis RN  Outcome: Ongoing  3/24/2021 0036 by Nathan Matute RN  Outcome: Ongoing  Goal: Absence of new skin breakdown  Description: Absence of new skin breakdown  3/24/2021 1309 by Rose Ennis RN  Outcome: Ongoing  3/24/2021 0036 by Nathan Matute RN  Outcome: Ongoing     Problem: Confusion - Acute:  Goal: Absence of continued neurological deterioration signs and symptoms  Description: Absence of continued neurological deterioration signs and symptoms  Outcome: Ongoing  Goal: Mental status will be restored to baseline  Description: Mental status will be restored to baseline  Outcome: Ongoing     Problem: Discharge Planning:  Goal: Ability to perform activities of daily living will improve  Description: Ability to perform activities of daily living will improve  Outcome: Ongoing  Goal: Participates in care planning  Description: Participates in care planning  Outcome: Ongoing     Problem: Injury - Risk of, Physical Injury:  Goal: Will remain free from falls  Description: Will remain free from falls  3/24/2021 1309 by Rose Ennis RN  Outcome: Ongoing  3/24/2021 0036 by Nathan Matute RN  Outcome: Ongoing  Goal: Absence of physical injury  Description: Absence of physical injury  3/24/2021 1309 by Rose Ennis RN  Outcome: Ongoing  3/24/2021 0036 by Nathan Matute RN  Outcome: Ongoing     Problem: Mood - Altered:  Goal: Mood stable  Description: Mood stable  Outcome: Ongoing  Goal: Absence of abusive behavior  Description: Absence of abusive behavior  Outcome: Ongoing  Goal: Verbalizations of feeling emotionally comfortable while being cared for will increase  Description: Verbalizations of feeling emotionally comfortable while being cared for will increase  Outcome: Ongoing     Problem: Psychomotor Activity - Altered:  Goal: Absence of psychomotor disturbance signs and symptoms  Description: Absence of psychomotor disturbance signs and symptoms  Outcome: Ongoing     Problem: Sensory Perception - Impaired:  Goal: Demonstrations of improved sensory functioning will increase  Description: Demonstrations of improved sensory functioning will increase  Outcome: Ongoing  Goal: Decrease in sensory misperception frequency  Description: Decrease in sensory misperception frequency  Outcome: Ongoing  Goal: Able to refrain from responding to false sensory perceptions  Description: Able to refrain from responding to false sensory perceptions  Outcome: Ongoing  Goal: Demonstrates accurate environmental perceptions  Description: Demonstrates accurate environmental perceptions  Outcome: Ongoing  Goal: Able to distinguish between reality-based and nonreality-based thinking  Description: Able to distinguish between reality-based and nonreality-based thinking  Outcome: Ongoing  Goal: Able to interrupt nonreality-based thinking  Description: Able to interrupt nonreality-based thinking  Outcome: Ongoing     Problem: Sleep Pattern Disturbance:  Goal: Appears well-rested  Description: Appears well-rested  Outcome: Ongoing

## 2021-03-24 NOTE — H&P
Hospital Medicine History & Physical      PCP: Blossom Cross MD    Date of Admission: 3/23/2021    Chief Complaint: Frequency urination    History Of Present Illness:    Patient is a 14-year-old male with past medical history of hypertension who presents to the hospital due to concern of altered mental status from home. Patient did show up to the emergency department at the request of patient's daughter. Patient mentions he has been having frequency urination and burning sensation otherwise denies chest pain shortness of breath nausea vomiting diarrhea constipation. According to the call received by EMS patient was found confused and was recommended to come to the emergency department. Past Medical History:      History reviewed. No pertinent past medical history. Past Surgical History:          Procedure Laterality Date    CATARACT REMOVAL      GLAUCOMA SURGERY         Medications Prior to Admission:      Prior to Admission medications    Medication Sig Start Date End Date Taking? Authorizing Provider   hydrochlorothiazide (MICROZIDE) 12.5 MG capsule TAKE 1 CAPSULE BY MOUTH EVERY DAY 10/21/19   Carlos Otto MD   loperamide (IMODIUM A-D) 2 MG tablet Take two tablets initially, then 1 tablet after every unformed stool. Maximum 8 tablets per day.  9/27/19   Carlos Otto MD   acetaminophen (TYLENOL) 500 MG tablet Take 1 tablet by mouth 4 times daily as needed for Pain 2/1/19   Carlos Otto MD   BESIVANCE 0.6 % SUSP  5/21/12   Historical Provider, MD   COMBIGAN 0.2-0.5 % ophthalmic solution  5/15/12   Historical Provider, MD   dorzolamide (TRUSOPT) 2 % ophthalmic solution  5/19/12   Historical Provider, MD   fluorometholone (FML) 0.1 % ophthalmic suspension  5/21/12   Historical Provider, MD   latanoprost (XALATAN) 0.005 % ophthalmic solution  5/19/12   Historical Provider, MD   PRED FORTE 1 % ophthalmic suspension  5/18/12   Historical Provider, MD       Allergies:  Patient has no known allergies. Social History:      TOBACCO:   reports that he has never smoked. He has never used smokeless tobacco.  ETOH:   reports no history of alcohol use. Family History:       Reviewed in detail and non contributory          Problem Relation Age of Onset    Glaucoma Mother     Glaucoma Father        REVIEW OF SYSTEMS:   Pertinent positives as noted in the HPI. All other systems reviewed and negative. PHYSICAL EXAM PERFORMED:    /74   Pulse 78   Temp 98 °F (36.7 °C) (Oral)   Resp 18   Ht 5' 4\" (1.626 m)   Wt 143 lb 4.8 oz (65 kg)   SpO2 95%   BMI 24.60 kg/m²     General appearance:  No apparent distress, cooperative. HEENT:  Normal cephalic, atraumatic without obvious deformity. Conjunctivae/corneas clear. Neck: Supple, with full range of motion. No cervical lymphadenopathy  Respiratory:  Normal respiratory effort. Clear to auscultation, bilaterally without Rales/Wheezes/Rhonchi. Cardiovascular:  Regular rate and rhythm with normal S1/S2 without murmurs, rubs or gallops. Abdomen: Soft, non-tender, non-distended, normal bowel sounds. Musculoskeletal:  No edema noted bilaterally. No tenderness on palpation   Skin: no rash visible  Neurologic:  Neurologically intact without any focal sensory/motor deficits. grossly non-focal.  Psychiatric:  Alert and oriented, normal mood  Peripheral Pulses: +2 palpable, equal bilaterally       Labs:     Recent Labs     03/23/21  1700   WBC 7.9   HGB 13.3*   HCT 40.6        Recent Labs     03/23/21  1700      K 4.2      CO2 21   BUN 25*   CREATININE 1.2   CALCIUM 9.4     Recent Labs     03/23/21  1700   AST 17   ALT 10   BILITOT 0.8   ALKPHOS 95     No results for input(s): INR in the last 72 hours. No results for input(s): Neldon Docker in the last 72 hours.     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 Mild atrophy and moderate chronic microischemic disease throughout the deep   white matter with no acute abnormality seen. Moderate chronic maxillary sinusitis. XR CHEST (2 VW)   Final Result   No acute cardiopulmonary disease. Active Hospital Problems    Diagnosis Date Noted    UTI (urinary tract infection) [N39.0] 03/23/2021         Patient is a 25-year-old male with past medical history of hypertension who presents to the hospital due to concern of altered mental status from home. Patient did show up to the emergency department at the request of patient's daughter. Patient mentions he has been having frequency urination and burning sensation otherwise denies chest pain shortness of breath nausea vomiting diarrhea constipation. According to the call received by EMS patient was found confused and was recommended to come to the emergency department. Assessment  Acute metabolic encephalopathy-resolved  Urinary tract infection  Hypertension      Plan  We will start IV Rocephin, follow-up on urine culture  Consult PT/OT  CT head performed in ED-negative for acute intracranial process  Resume home medications including HCTZ  DVT prophylaxis-Lovenox  Diet: DIET GENERAL;  Code Status: Full Code    PT/OT Eval Status: ordered    Dispo - pending clinical improvement       Zohaib Lane MD    The note was completed using EMR and Dragon dictation system. Every effort was made to ensure accuracy; however, inadvertent computerized transcription errors may be present. Thank you Lord Anand MD for the opportunity to be involved in this patient's care. If you have any questions or concerns please feel free to contact me at [phone].     Zohaib Lane MD

## 2021-03-24 NOTE — PROGRESS NOTES
Physical Therapy    Facility/Department: [address] MED SURG  Initial Assessment    NAME: Frankie Wan  : 1934  MRN: [id number]    Date of Service: 3/24/2021    Discharge Recommendations:  Patient would benefit from continued therapy after discharge, 5-7 sessions per week, 3-5 sessions per week     Frankie Wan scored a 17/24 on the AM-PAC short mobility form. Current research shows that an AM-PAC score of 17 or less is typically not associated with a discharge to the patient's home setting. Based on the patient's AM-PAC score and their current functional mobility deficits, it is recommended that the patient have 5-7 sessions per week of Physical Therapy at d/c to increase the patient's independence. At this time, this patient demonstrates the endurance, and/or tolerance for 3 hours of therapy each day, with a treatment frequency of 5-7x/wk. Please see assessment section for further patient specific details. If patient discharges prior to next session this note will serve as a discharge summary. Please see below for the latest assessment towards goals. Assessment   Body structures, Functions, Activity limitations: Decreased functional mobility ; Decreased ADL status; Decreased strength;Decreased balance  Assessment: Patient is a 43-year-old male with past medical history of hypertension who presents to the hospital on 3/23/21 due to concern of altered mental status following PCP appointment - couldnt find his car. Pt reports he lives in an independent living facility, independent with all functional mobility, no DME for ambulation. Pt currently functioning below baseline - unsteady with ambulation, specifically with retrograde ambulation to toilet or to recliner. Pt unaware that he is incontinent of urine. Currently unsafe to return to his independent living facility.  Recommend continued skilled therapy 5-7x/week vs 3-5x/week to maximize independence and safety with functional mobility in order to return back to his independent living situation. Hopeful his balance will improve as his UTI clears and could potentially return home at that time. Treatment Diagnosis: impaired mobility; impaired balance  Prognosis: Good  Decision Making: Medium Complexity  History: see below  Exam: see below  Clinical Presentation: evolving  PT Education: PT Role;Plan of Care;General Safety;Transfer Training;Gait Training;Functional Mobility Training  REQUIRES PT FOLLOW UP: Yes  Activity Tolerance  Activity Tolerance: Patient Tolerated treatment well       Patient Diagnosis(es): The primary encounter diagnosis was Altered mental status, unspecified altered mental status type. Diagnoses of Acute cystitis without hematuria and Progressive dementia with uncertain etiology [facility]) were also pertinent to this visit. has no past medical history on file. has a past surgical history that includes Cataract removal and Glaucoma surgery. Restrictions  Restrictions/Precautions  Restrictions/Precautions: Fall Risk     Subjective  General  Chart Reviewed: Yes  Patient assessed for rehabilitation services?: Yes  Additional Pertinent Hx: Patient is a 80-year-old male with past medical history of hypertension who presents to the hospital on 3/23/21 due to concern of altered mental status following PCP appointment - couldnt find his car. Response To Previous Treatment: Not applicable  Family / Caregiver Present: No  Referring Practitioner: Zarina Deleon MD  Referral Date : 03/23/21  Diagnosis: UTI  Follows Commands: Within Functional Limits  Subjective  Subjective: Pt is agreeable to PT.   Pain Screening  Patient Currently in Pain: Denies          Orientation  Orientation  Overall Orientation Status: Within Functional Limits     Social/Functional History  Social/Functional History  Lives With: Alone  Type of Home: Apartment(independent living)  Home Layout: One level  Home Access: Elevator, Level entry(3rd floor; often uses elevator to

## 2021-03-24 NOTE — PROGRESS NOTES
Diagnoses of Acute cystitis without hematuria and Progressive dementia with uncertain etiology [facility]) were also pertinent to this visit. has no past medical history on file. has a past surgical history that includes Cataract removal and Glaucoma surgery. Restrictions  Restrictions/Precautions  Restrictions/Precautions: Fall Risk    Subjective   General  Chart Reviewed: Yes  Patient assessed for rehabilitation services?: Yes  Additional Pertinent Hx: Per Dr. Eric Heredia, \"Junior Ortiz is a 80 y.o. male who presents to the emergency department from his PCP office via EMS for increased confusion and urinary incontinence. HPI and ROS limited by pt AMS. Family lives in [place] and he has no family here. The patient is primarily concerned about his increased urinary incontinence. No triggers such as laughing coughing or bearing down. Has been wearing depends over last several months. Says this has been an issue 6 months to a year but is a poor historian. He denies any symptoms including hematuria, pain, SOB, dizziness. He is only able to do 1 serial 7. Failed 3 word recall. Oriented to person, place and time. \"  Family / Caregiver Present: No  Referring Practitioner: Dr. Cadena   Diagnosis: UTI, AMS  Subjective  Subjective: Agreed to OT/PT, some confusion noted but no complaints    Social/Functional History  Social/Functional History  Lives With: Alone  Type of Home: Apartment(independent living)  Home Layout: One level  Home Access: Elevator, Level entry(3rd floor; often uses elevator to go up, takes steps down)  Bathroom Shower/Tub: Walk-in shower  Bathroom Toilet: Standard  Bathroom Equipment: Grab bars in shower, Built-in shower seat, Grab bars around toilet  ADL Assistance: Independent  Homemaking Assistance: Independent  Ambulation Assistance: Independent  Transfer Assistance: Independent  Active : Yes  Mode of Transportation: Car  Occupation: Retired  Type of occupation: postal service  Leisure & Hobbies: reading, watch tv  Additional Comments: Denies recent falls       Objective   Vision: Within Functional Limits  Hearing: Within functional limits    Orientation  Overall Orientation Status: Within Functional Limits     Functional Mobility  Functional - Mobility Device: No device  Activity: To/from bathroom  Assist Level: Contact guard assistance  Functional Mobility Comments: CG/Min A for balance (losing balance posteriorly occasionally)  Toilet Transfers  Toilet - Technique: Ambulating  Equipment Used: Grab bars  Toilet Transfer: Contact guard assistance;Minimal assistance  ADL  LE Dressing: Stand by assistance  Toileting: Stand by assistance  Additional Comments: CGA/Min A for standing balance and cues for safety  Tone RUE  RUE Tone: Normotonic  Tone LUE  LUE Tone: Normotonic  Coordination  Movements Are Fluid And Coordinated: Yes     Bed mobility  Supine to Sit: Stand by assistance  Sit to Supine: Unable to assess(left in chair at end of session)  Transfers  Sit to stand: Contact guard assistance;Minimal assistance  Stand to sit: Contact guard assistance;Minimal assistance     Cognition  Overall Cognitive Status: Exceptions  Following Commands:  Follows one step commands consistently  Attention Span: Attends with cues to redirect  Memory: Decreased recall of recent events;Decreased short term memory  Safety Judgement: Decreased awareness of need for assistance;Decreased awareness of need for safety  Problem Solving: Assistance required to generate solutions;Assistance required to implement solutions  Insights: Decreased awareness of deficits  Initiation: Requires cues for some                 LUE AROM (degrees)  LUE AROM : WFL  RUE AROM (degrees)  RUE AROM : WFL  LUE Strength  Gross LUE Strength: WFL  RUE Strength  Gross RUE Strength: WFL                   Plan   Plan  Times per week: 3-5  Times per day: Daily  Plan weeks: 1  Current Treatment Recommendations: Patient/Caregiver Education & Training, Home Management Training, Equipment Evaluation, Education, & procurement, Balance Training, Functional Mobility Training, Endurance Training, Safety Education & Training, Self-Care / ADL         OutComes Score    Ming Ambrose scored a 17/24 on the AM-[facility] ADL Inpatient form. Current research shows that an AM-PAC score of 17 or less is typically not associated with a discharge to the patient's home setting. Based on the patient's AM-PAC score and their current ADL deficits, it is recommended that the patient have 3-5 or 5-7 sessions per week of Occupational Therapy at d/c to increase the patient's independence. At this time, this patient demonstrates the endurance, and/or tolerance for 3 hours of therapy each day, with a treatment frequency of 5-7x/wk. Please see assessment section for further patient specific details. If patient discharges prior to next session this note will serve as a discharge summary. Please see below for the latest assessment towards goals.                                                   AM-PAC Score        AM-[facility] Inpatient Daily Activity Raw Score: 17 (03/24/21 0943)  AM-PAC Inpatient ADL T-Scale Score : 37.26 (03/24/21 0943)  ADL Inpatient CMS 0-100% Score: 50.11 (03/24/21 0943)  ADL Inpatient CMS G-Code Modifier : CK (03/24/21 0943)    Goals  Short term goals  Time Frame for Short term goals: 1 week  Short term goal 1: Pt will be independent with functional transfers  Short term goal 2: Pt will be independent with functional mobility  Short term goal 3: Pt will be independent with bed mobility  Short term goal 4: Pt will be independent with bathing and dressing  Short term goal 5: Pt will be independent with toileting  Long term goals  Time Frame for Long term goals : STG = LTG  Patient Goals   Patient goals : Pt wants to return to his apartment       Therapy Time   Individual Concurrent Group Co-treatment   Time In 0900         Time Out 0943         Minutes 43         Timed Code Treatment Minutes: [address], [place]

## 2021-03-24 NOTE — PROGRESS NOTES
Pharmacy Medication Reconciliation Note     List of medications patient is currently taking is complete. Source of information:   1. Conversation with pt at bedside. He was able to tell me that [facility] fills [facility] medication     No Known Allergies    Notes regarding home medications:   1. [facility] is only filling Latanoprost - one drop both eyes at [facility]   2.   HCTZ has not been filled since 2019     Jeff Arango [facility]  3/24/2021  10:23 AM

## 2021-03-25 VITALS
TEMPERATURE: 98.1 F | DIASTOLIC BLOOD PRESSURE: 63 MMHG | HEIGHT: 64 IN | RESPIRATION RATE: 18 BRPM | OXYGEN SATURATION: 99 % | BODY MASS INDEX: 23.18 KG/M2 | SYSTOLIC BLOOD PRESSURE: 105 MMHG | WEIGHT: 135.8 LBS | HEART RATE: 72 BPM

## 2021-03-25 LAB
ANION GAP SERPL CALCULATED.3IONS-SCNC: 12 MMOL/L (ref 3–16)
BUN BLDV-MCNC: 29 MG/DL (ref 7–20)
CALCIUM SERPL-MCNC: 9.2 MG/DL (ref 8.3–10.6)
CHLORIDE BLD-SCNC: 108 MMOL/L (ref 99–110)
CO2: 22 MMOL/L (ref 21–32)
CREAT SERPL-MCNC: 1.3 MG/DL (ref 0.8–1.3)
GFR AFRICAN AMERICAN: >60
GFR NON-AFRICAN AMERICAN: 52
GLUCOSE BLD-MCNC: 113 MG/DL (ref 70–99)
HCT VFR BLD CALC: 39.3 % (ref 40.5–52.5)
HEMOGLOBIN: 12.8 G/DL (ref 13.5–17.5)
MCH RBC QN AUTO: 26 PG (ref 26–34)
MCHC RBC AUTO-ENTMCNC: 32.5 G/DL (ref 31–36)
MCV RBC AUTO: 79.9 FL (ref 80–100)
PDW BLD-RTO: 15.6 % (ref 12.4–15.4)
PLATELET # BLD: 242 K/UL (ref 135–450)
PMV BLD AUTO: 7.3 FL (ref 5–10.5)
POTASSIUM REFLEX MAGNESIUM: 4.3 MMOL/L (ref 3.5–5.1)
RBC # BLD: 4.92 M/UL (ref 4.2–5.9)
SODIUM BLD-SCNC: 142 MMOL/L (ref 136–145)
WBC # BLD: 6 K/UL (ref 4–11)

## 2021-03-25 PROCEDURE — 80048 BASIC METABOLIC PNL TOTAL CA: CPT

## 2021-03-25 PROCEDURE — 94760 N-INVAS EAR/PLS OXIMETRY 1: CPT

## 2021-03-25 PROCEDURE — 85027 COMPLETE CBC AUTOMATED: CPT

## 2021-03-25 PROCEDURE — 36415 COLL VENOUS BLD VENIPUNCTURE: CPT

## 2021-03-25 PROCEDURE — 2580000003 HC RX 258: Performed by: INTERNAL MEDICINE

## 2021-03-25 PROCEDURE — 6360000002 HC RX W HCPCS: Performed by: INTERNAL MEDICINE

## 2021-03-25 PROCEDURE — 97530 THERAPEUTIC ACTIVITIES: CPT

## 2021-03-25 RX ORDER — CEFDINIR 300 MG/1
300 CAPSULE ORAL 2 TIMES DAILY
Qty: 10 CAPSULE | Refills: 0 | Status: SHIPPED | OUTPATIENT
Start: 2021-03-25 | End: 2021-03-30

## 2021-03-25 RX ADMIN — SODIUM CHLORIDE, PRESERVATIVE FREE 10 ML: 5 INJECTION INTRAVENOUS at 09:13

## 2021-03-25 RX ADMIN — ENOXAPARIN SODIUM 40 MG: 40 INJECTION SUBCUTANEOUS at 09:11

## 2021-03-25 NOTE — PLAN OF CARE
Problem: Falls - Risk of:  Goal: Will remain free from falls  Description: Will remain free from falls  3/25/2021 1518 by Patti Shore RN  Outcome: Completed  3/25/2021 0256 by Rachna Morales RN  Outcome: Ongoing  Goal: Absence of physical injury  Description: Absence of physical injury  3/25/2021 1518 by Patti Shore RN  Outcome: Completed  3/25/2021 0256 by Rachna Morales RN  Outcome: Ongoing     Problem: Skin Integrity:  Goal: Will show no infection signs and symptoms  Description: Will show no infection signs and symptoms  3/25/2021 1518 by Patti Shore RN  Outcome: Completed  3/25/2021 0256 by Rachna Morales RN  Outcome: Ongoing  Goal: Absence of new skin breakdown  Description: Absence of new skin breakdown  3/25/2021 1518 by Patti Shore RN  Outcome: Completed  3/25/2021 0256 by Rachna Morales RN  Outcome: Ongoing     Problem: Confusion - Acute:  Goal: Absence of continued neurological deterioration signs and symptoms  Description: Absence of continued neurological deterioration signs and symptoms  3/25/2021 1518 by Patti Shore RN  Outcome: Completed  3/25/2021 0256 by Rachna Morales RN  Outcome: Ongoing  Goal: Mental status will be restored to baseline  Description: Mental status will be restored to baseline  3/25/2021 1518 by Patti Shore RN  Outcome: Completed  3/25/2021 0256 by Rachna Morales RN  Outcome: Ongoing     Problem: Discharge Planning:  Goal: Ability to perform activities of daily living will improve  Description: Ability to perform activities of daily living will improve  3/25/2021 1518 by Patti Shore RN  Outcome: Completed  3/25/2021 0256 by Rachna Morales RN  Outcome: Ongoing  Goal: Participates in care planning  Description: Participates in care planning  3/25/2021 1518 by Patti Shore RN  Outcome: Completed  3/25/2021 0256 by Rachna Morales RN  Outcome: Ongoing     Problem: Injury - Risk of, Physical Injury:  Goal: Will remain free from falls  Description: Will remain free from falls  3/25/2021 1518 by Alan Manzano RN  Outcome: Completed  3/25/2021 0256 by Monalisa Verdin RN  Outcome: Ongoing  Goal: Absence of physical injury  Description: Absence of physical injury  3/25/2021 1518 by Alan Manzano RN  Outcome: Completed  3/25/2021 0256 by Monalisa Verdin RN  Outcome: Ongoing     Problem: Mood - Altered:  Goal: Mood stable  Description: Mood stable  3/25/2021 1518 by Alan Manzano RN  Outcome: Completed  3/25/2021 0256 by Monalisa Verdin RN  Outcome: Ongoing  Goal: Absence of abusive behavior  Description: Absence of abusive behavior  3/25/2021 1518 by Alan Manzano RN  Outcome: Completed  3/25/2021 0256 by Monalisa Verdin RN  Outcome: Ongoing  Goal: Verbalizations of feeling emotionally comfortable while being cared for will increase  Description: Verbalizations of feeling emotionally comfortable while being cared for will increase  3/25/2021 1518 by Alan Manzano RN  Outcome: Completed  3/25/2021 0256 by Monalisa Verdin RN  Outcome: Ongoing     Problem: Psychomotor Activity - Altered:  Goal: Absence of psychomotor disturbance signs and symptoms  Description: Absence of psychomotor disturbance signs and symptoms  3/25/2021 1518 by Alan Manzano RN  Outcome: Completed  3/25/2021 0256 by Monalisa Verdin RN  Outcome: Ongoing     Problem: Sensory Perception - Impaired:  Goal: Demonstrations of improved sensory functioning will increase  Description: Demonstrations of improved sensory functioning will increase  3/25/2021 1518 by Alan Manzano RN  Outcome: Completed  3/25/2021 0256 by Monalisa Verdin RN  Outcome: Ongoing  Goal: Decrease in sensory misperception frequency  Description: Decrease in sensory misperception frequency  3/25/2021 1518 by Alan Manzano RN  Outcome: Completed  3/25/2021 0256 by Monalisa Verdin RN  Outcome: Ongoing  Goal: Able to refrain from responding to false sensory perceptions  Description: Able to refrain from responding to false sensory perceptions  3/25/2021 1518 by Javon Fay RN  Outcome: Completed  3/25/2021 0256 by Stefan Orlando RN  Outcome: Ongoing  Goal: Demonstrates accurate environmental perceptions  Description: Demonstrates accurate environmental perceptions  3/25/2021 1518 by Javon Fay RN  Outcome: Completed  3/25/2021 0256 by Stefan Orlando RN  Outcome: Ongoing  Goal: Able to distinguish between reality-based and nonreality-based thinking  Description: Able to distinguish between reality-based and nonreality-based thinking  3/25/2021 1518 by Javon Fay RN  Outcome: Completed  3/25/2021 0256 by Stefan Orlando RN  Outcome: Ongoing  Goal: Able to interrupt nonreality-based thinking  Description: Able to interrupt nonreality-based thinking  3/25/2021 1518 by Javon Fay RN  Outcome: Completed  3/25/2021 0256 by Stefan Orlando RN  Outcome: Ongoing     Problem: Sleep Pattern Disturbance:  Goal: Appears well-rested  Description: Appears well-rested  3/25/2021 1518 by Javon Fay RN  Outcome: Completed  3/25/2021 0256 by Stefan Orlando RN  Outcome: Ongoing

## 2021-03-25 NOTE — DISCHARGE INSTR - COC
Resp 15   Ht 5' 4\" (1.626 m)   Wt 135 lb 12.9 oz (61.6 kg)   SpO2 98%   BMI 23.31 kg/m²     Last documented pain score (0-10 scale): Pain Level: 0  Last Weight:   Wt Readings from Last 1 Encounters:   03/25/21 135 lb 12.9 oz (61.6 kg)     Mental Status:  disoriented, logical and thought processes intact    IV Access:  - None    Nursing Mobility/ADLs:  Walking   Assisted  Transfer  Assisted  Bathing  Assisted  Dressing  Assisted  Toileting  Assisted  Feeding  Independent  Med Admin  Independent  Med Delivery   whole    Wound Care Documentation and Therapy:        Elimination:  Continence:   · Bowel: Yes  · Bladder: Yes  Urinary Catheter: None   Colostomy/Ileostomy/Ileal Conduit: No       Date of Last BM: 3/24/2021    Intake/Output Summary (Last 24 hours) at 3/25/2021 0923  Last data filed at 3/25/2021 0815  Gross per 24 hour   Intake 540 ml   Output --   Net 540 ml     I/O last 3 completed shifts: In: 300 [P.O.:300]  Out: -     Safety Concerns: At Risk for Falls    Impairments/Disabilities:      Confusion    Nutrition Therapy:  Current Nutrition Therapy:   - Oral Diet:  General    Routes of Feeding: Oral  Liquids: Thin Liquids  Daily Fluid Restriction: no  Last Modified Barium Swallow with Video (Video Swallowing Test): not done    Treatments at the Time of Hospital Discharge:   Respiratory Treatments: N/a  Oxygen Therapy:  is not on home oxygen therapy.   Ventilator:    - No ventilator support    Rehab Therapies: Physical Therapy and Occupational Therapy  Weight Bearing Status/Restrictions: No weight bearing restirctions  Other Medical Equipment (for information only, NOT a DME order):  wheelchair and walker  Other Treatments: N/a    Patient's personal belongings (please select all that are sent with patient):  None    RN SIGNATURE:  Electronically signed by Aishwarya Caputo RN on 3/25/21 at 3:34 PM EDT    CASE MANAGEMENT/SOCIAL WORK SECTION    Inpatient Status Date: ***    Readmission Risk Assessment Score:  Readmission Risk              Risk of Unplanned Readmission:        11           Discharging to Facility/ Agency   · Name:   · Address:  · Phone:  · Fax:    Dialysis Facility (if applicable)   · Name:  · Address:  · Dialysis Schedule:  · Phone:  · Fax:    / signature: {Esignature:[id number]:::0}    PHYSICIAN SECTION    Prognosis: Good    Condition at Discharge: Stable    Rehab Potential (if transferring to Rehab): Good    Recommended Labs or Other Treatments After Discharge: RN/PT/OT    Physician Certification: I certify the above information and transfer of Jeremie Wagner  is necessary for the continuing treatment of the diagnosis listed and that he requires [place] for less 30 days.      Update Admission H&P: No change in H&P    PHYSICIAN SIGNATURE:  Electronically signed by TOÑA Andersen CNP on 3/25/21 at 9:23 AM EDT/ Dr. George Monzon

## 2021-03-25 NOTE — PROGRESS NOTES
Occupational Therapy  Facility/Department: [address] MED SURG  Daily Treatment Note  NAME: Guadalupe Bauer  : 1934  MRN: [id number]    Date of Service: 3/25/2021    Discharge Recommendations:  3-5 sessions per week, 5-7 sessions per week, Patient would benefit from continued therapy after discharge, Continue to assess pending progress (Although [facility] indicates safe transition to home pt not safe to return home at this time as he lives in independent living with driving and demonstrating confusion and decreased awareness of safety.)       Assessment   Performance deficits / Impairments: Decreased functional mobility ; Decreased endurance;Decreased ADL status; Decreased balance;Decreased high-level IADLs;Decreased safe awareness;Decreased cognition  Assessment: Pt presents with frequent urination, AMS. Pt lives alone in senior building and reports independence with self care and functional mobility (and driving!). Pt currently with confusion and required CGA for functional mobility. Pt demonstrating confusion and has conflicting answers vs the chart. Reports family lives in [place] but chart reports no family around. Also noted in the chart daughter reported pt has been getting lost when driving outside his familiar areas. Pt denies this. Pt demonstrating decreased awareness of deficits and safety awareness. Pt functioning below baseline and would not be safe to be home alone. Recommend cont OT to increase independence with self care and functional mobility. Recommend evaluation of driving ability as an OP. Rec. d/c to facility for 3-5 x or 5-7 x week. Prognosis: Fair  REQUIRES OT FOLLOW UP: Yes  Activity Tolerance  Activity Tolerance: Treatment limited secondary to decreased cognition  Safety Devices  Type of devices: Call light within reach; Bed alarm in place; Left in bed;Gait belt         Patient Diagnosis(es): The primary encounter diagnosis was Altered mental status, unspecified altered mental status type. Diagnoses of Acute cystitis without hematuria and Progressive dementia with uncertain etiology [facility]) were also pertinent to this visit. has no past medical history on file. has a past surgical history that includes Cataract removal and Glaucoma surgery. Restrictions  Restrictions/Precautions  Restrictions/Precautions: Fall Risk  Subjective   General  Chart Reviewed: Yes, Progress Notes  Patient assessed for rehabilitation services?: Yes  Additional Pertinent Hx: Per Dr. Duke Serrato, \"Junior Weiss is a 80 y.o. male who presents to the emergency department from his PCP office via EMS for increased confusion and urinary incontinence. HPI and ROS limited by pt AMS. Family lives in [place] and he has no family here. The patient is primarily concerned about his increased urinary incontinence. No triggers such as laughing coughing or bearing down. Has been wearing depends over last several months. Says this has been an issue 6 months to a year but is a poor historian. He denies any symptoms including hematuria, pain, SOB, dizziness. He is only able to do 1 serial 7. Failed 3 word recall. Oriented to person, place and time. \"  Family / Caregiver Present: No  Referring Practitioner: Dr. Niesha Lai  Diagnosis: UTI, AMS  Subjective  Subjective: Pt seen bedside and agreed to OT treatment. Pt asked multiple times during the session when he is going home. He wants to go home this date. Also reports he does not understand what is wrong with him and why he is getting all the medications. Objective    ADL  LE Dressing: (Pt was able to  his feet to manage both socks. Anticipate pt able to manuel pant legs.)  Additional Comments: Declined ADL's this date. Functional Mobility  Functional - Mobility Device: No device  Activity: To/from bathroom; Other(Ambulated around the bed to the recliner.)  Assist Level: Contact guard assistance  Functional Mobility Comments: Pt with LOB but used the wall to correct.   Toilet Transfers  Toilet Transfers Comments: Declined need to use the toilet. Pt reports just used the toilet. Wheelchair Bed Transfers  Wheelchair/Bed - Technique: Ambulating  Equipment Used: Bed;Other(recliner)  Level of Asssistance: Contact guard assistance  Bed mobility  Supine to Sit: Stand by assistance  Sit to Supine: Stand by assistance  Transfers  Sit to stand: Contact guard assistance  Stand to sit: Contact guard assistance                       Cognition  Following Commands: Follows one step commands consistently  Attention Span: Attends with cues to redirect(Pt continually talking about going home.)  Memory: Decreased recall of recent events;Decreased short term memory  Safety Judgement: Decreased awareness of need for assistance;Decreased awareness of need for safety  Problem Solving: Assistance required to generate solutions;Assistance required to implement solutions  Insights: Decreased awareness of deficits  Cognition Comment: Pt oriented to self, month, year.                                          Plan   Plan  Times per week: 3-5  Times per day: Daily  Plan weeks: 1  Current Treatment Recommendations: Patient/Caregiver Education & Training, Home Management Training, Equipment Evaluation, Education, & procurement, Balance Training, Functional Mobility Training, Endurance Training, Safety Education & Training, Self-Care / ADL    AM-PAC Score        AM-[facility] Inpatient Daily Activity Raw Score: 18 (03/25/21 1158)  AM-PAC Inpatient ADL T-Scale Score : 38.66 (03/25/21 1158)  ADL Inpatient CMS 0-100% Score: 46.65 (03/25/21 1158)  ADL Inpatient CMS G-Code Modifier : CK (03/25/21 1158)    Goals  Short term goals  Time Frame for Short term goals: 1 week  Short term goal 1: Pt will be independent with functional transfers  Short term goal 2: Pt will be independent with functional mobility  Short term goal 3: Pt will be independent with bed mobility  Short term goal 4: Pt will be independent with bathing and dressing  Short term goal 5: Pt will be independent with toileting  Long term goals  Time Frame for Long term goals : STG = LTG  Patient Goals   Patient goals : Pt wants to return to his apartment       Therapy Time   Individual Concurrent Group Co-treatment   Time In 1130         Time Out 1155         Minutes 25              This note to serve as OT d/c summary if pt is d/c-ed from hospital prior to next OT session.       Maya Pinto, [address]

## 2021-03-25 NOTE — PLAN OF CARE
Problem: Falls - Risk of:  Goal: Will remain free from falls  Description: Will remain free from falls  3/25/2021 0256 by Danita Guallpa RN  Outcome: Ongoing  3/24/2021 1309 by Marcelino Hargrove RN  Outcome: Ongoing  Goal: Absence of physical injury  Description: Absence of physical injury  3/25/2021 0256 by Danita Guallpa RN  Outcome: Ongoing  3/24/2021 1309 by Marcelino Hargrove RN  Outcome: Ongoing     Problem: Skin Integrity:  Goal: Will show no infection signs and symptoms  Description: Will show no infection signs and symptoms  3/25/2021 0256 by Danita Guallpa RN  Outcome: Ongoing  3/24/2021 1309 by Marcelino Hargrove RN  Outcome: Ongoing  Goal: Absence of new skin breakdown  Description: Absence of new skin breakdown  3/25/2021 0256 by Danita Guallpa RN  Outcome: Ongoing  3/24/2021 1309 by Marcelino Hargrove RN  Outcome: Ongoing     Problem: Confusion - Acute:  Goal: Absence of continued neurological deterioration signs and symptoms  Description: Absence of continued neurological deterioration signs and symptoms  3/25/2021 0256 by Danita Guallpa RN  Outcome: Ongoing  3/24/2021 1309 by Marcelino Hargrove RN  Outcome: Ongoing  Goal: Mental status will be restored to baseline  Description: Mental status will be restored to baseline  3/25/2021 0256 by Danita Guallpa RN  Outcome: Ongoing  3/24/2021 1309 by Marcelino Hargrove RN  Outcome: Ongoing     Problem: Discharge Planning:  Goal: Ability to perform activities of daily living will improve  Description: Ability to perform activities of daily living will improve  3/25/2021 0256 by Danita Guallpa RN  Outcome: Ongoing  3/24/2021 1309 by Marcelino Hargrove RN  Outcome: Ongoing  Goal: Participates in care planning  Description: Participates in care planning  3/25/2021 0256 by Danita Guallpa RN  Outcome: Ongoing  3/24/2021 1309 by Marcelino Hargrove RN  Outcome: Ongoing     Problem: Injury - Risk of, Physical Injury:  Goal: Will remain free from falls  Description: Will remain free from falls  3/25/2021 0256 by Josh Garcia RN  Outcome: Ongoing  3/24/2021 1309 by Radhika Lr RN  Outcome: Ongoing  Goal: Absence of physical injury  Description: Absence of physical injury  3/25/2021 0256 by Josh Garcia RN  Outcome: Ongoing  3/24/2021 1309 by Radhika Lr RN  Outcome: Ongoing     Problem: Mood - Altered:  Goal: Mood stable  Description: Mood stable  3/25/2021 0256 by Josh Garcia RN  Outcome: Ongoing  3/24/2021 1309 by Radhika Lr RN  Outcome: Ongoing  Goal: Absence of abusive behavior  Description: Absence of abusive behavior  3/25/2021 0256 by Josh Garcia RN  Outcome: Ongoing  3/24/2021 1309 by Radhika Lr RN  Outcome: Ongoing  Goal: Verbalizations of feeling emotionally comfortable while being cared for will increase  Description: Verbalizations of feeling emotionally comfortable while being cared for will increase  3/25/2021 0256 by Josh Garcia RN  Outcome: Ongoing  3/24/2021 1309 by Radhika Lr RN  Outcome: Ongoing     Problem: Psychomotor Activity - Altered:  Goal: Absence of psychomotor disturbance signs and symptoms  Description: Absence of psychomotor disturbance signs and symptoms  3/25/2021 0256 by Josh Garcia RN  Outcome: Ongoing  3/24/2021 1309 by Radhika Lr RN  Outcome: Ongoing     Problem: Sensory Perception - Impaired:  Goal: Demonstrations of improved sensory functioning will increase  Description: Demonstrations of improved sensory functioning will increase  3/25/2021 0256 by Josh Garcia RN  Outcome: Ongoing  3/24/2021 1309 by Radhika Lr RN  Outcome: Ongoing  Goal: Decrease in sensory misperception frequency  Description: Decrease in sensory misperception frequency  3/25/2021 0256 by Josh Garcia RN  Outcome: Ongoing  3/24/2021 1309 by Radhika Lr RN  Outcome: Ongoing  Goal: Able to refrain from responding to false sensory perceptions  Description: Able to refrain from responding to false sensory perceptions  3/25/2021 0256 by Vladimir Phoenix RN  Outcome: Ongoing  3/24/2021 1309 by Bethanie Soto RN  Outcome: Ongoing  Goal: Demonstrates accurate environmental perceptions  Description: Demonstrates accurate environmental perceptions  3/25/2021 0256 by Vladimir Phoenix RN  Outcome: Ongoing  3/24/2021 1309 by Bethanie Soto RN  Outcome: Ongoing  Goal: Able to distinguish between reality-based and nonreality-based thinking  Description: Able to distinguish between reality-based and nonreality-based thinking  3/25/2021 0256 by Vladimir Phoenix RN  Outcome: Ongoing  3/24/2021 1309 by Bethanie Soto RN  Outcome: Ongoing  Goal: Able to interrupt nonreality-based thinking  Description: Able to interrupt nonreality-based thinking  3/25/2021 0256 by Vladimir Phoenix RN  Outcome: Ongoing  3/24/2021 1309 by Bethanie Soto RN  Outcome: Ongoing     Problem: Sleep Pattern Disturbance:  Goal: Appears well-rested  Description: Appears well-rested  3/25/2021 0256 by Vladimir Phoenix RN  Outcome: Ongoing  3/24/2021 1309 by Bethanie Soto RN  Outcome: Ongoing

## 2021-03-25 NOTE — PROGRESS NOTES
Hospital Medicine Progress Note      Admit Date: 3/23/2021       CC: F/U for urinary frequency and burning sensation    HPI: Patient is a 51-year-old male with past medical history of hypertension who presents to the hospital due to concern of altered mental status from home.  Patient did show up to the emergency department at the request of patient's daughter. José Luis Villalpando mentions he has been having frequency urination and burning sensation otherwise denies chest pain shortness of breath nausea vomiting diarrhea constipation.  According to the call received by EMS patient was found confused and was recommended to come to the emergency department.        3/24: patient very anxious. Sitting in chair. As soon as I enter, he starts going on about how no one has fed him, no one is doing anything, he is ready to get out of here. He needs me to go get his cell phone.      Explained who I was and what brought him in. States \"I was urinating all the time. \"  Explained he is getting antibiotics. He states again, \"I want to get out of here. You all aren't doing anything for me. \"      cont antibiotics. Watch cx.     Spoke to nurse who has contacted daughter multiple times today. She missed her flight to get here. She says they want him placed in a facility on discharge due to his worsening confusion/dementia lately.      Social work is involved. Interval History/Subjective: tonight is his last dose of antibiotics and we can discharge on oral antibx if needed. Will be medically ready to discharge when facility is ready to accept.     ---- addendum: according to social work: patient's niece will come and get him who he will stay with until they have a place for him to go permanently    Review of Systems:     The patient denied headaches, visual changes, LOC, SOB, CP, ABD pain, N/V/D, skin changes, new or worsening weakness or neuromuscular deficits. Comprehensive ROS negative except as mentioned above.     Past Medical History:    History reviewed. No pertinent past medical history. Past Surgical History:        Procedure Laterality Date    CATARACT REMOVAL      GLAUCOMA SURGERY         Allergies:  Patient has no known allergies. Past medical and surgical history reviewed. Any changes have been noted. PHYSICAL EXAM:  /67   Pulse 65   Temp 97.7 °F (36.5 °C) (Oral)   Resp 15   Ht 5' 4\" (1.626 m)   Wt 135 lb 12.9 oz (61.6 kg)   SpO2 98%   BMI 23.31 kg/m²       Intake/Output Summary (Last 24 hours) at 3/25/2021 0916  Last data filed at 3/25/2021 0815  Gross per 24 hour   Intake 540 ml   Output --   Net 540 ml        General appearance:   Very anxious and agitated,  appears stated age. Cooperative. HEENT:  Normocephalic, atraumatic. PERRLA. EOMi. Conjunctivae/corneas clear, no icterus, non-injected. Neck: Supple, with full range of motion. No jugular venous distention. Trachea midline. Respiratory:  Normal respiratory effort. Clear to auscultation, bilaterally without Rales/Wheezes/Rhonchi. Cardiovascular:  Regular rate and rhythm without murmurs, rubs or gallops. Abdomen: Soft, non-tender, non-distended, without rebound or guarding. Normal bowel sounds. Musculoskeletal:  No clubbing, cyanosis or edema bilaterally. Full range of motion without deformity. Skin: Skin color, texture, turgor normal.  No rashes or lesions. Neurologic:  Neurovascularly intact without any focal sensory/motor deficits. Cranial nerves: II-XII intact, grossly intact. No facial asymmetry, tongue midline.    Psychiatric:  Alert, oriented to self, confused, repeating himself, anxious and aggravated  Capillary Refill: Brisk,< 3 seconds   Peripheral Pulses: +2 palpable, equal bilaterally             LABS:    Lab Results   Component Value Date    WBC 6.0 03/25/2021    HGB 12.8 (L) 03/25/2021    HCT 39.3 (L) 03/25/2021    MCV 79.9 (L) 03/25/2021     03/25/2021    LYMPHOPCT 21.2 03/23/2021    RBC 4.92 03/25/2021    MCH 26.0 03/25/2021    MCHC 32.5 03/25/2021    RDW 15.6 (H) 03/25/2021       Lab Results   Component Value Date    CREATININE 1.3 03/25/2021    BUN 29 (H) 03/25/2021     03/25/2021    K 4.3 03/25/2021     03/25/2021    CO2 22 03/25/2021       No results found for: MG    Lab Results   Component Value Date    ALT 10 03/23/2021    AST 17 03/23/2021    ALKPHOS 95 03/23/2021    BILITOT 0.8 03/23/2021        No flowsheet data found. 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 Mild atrophy and moderate chronic microischemic disease throughout the deep   white matter with no acute abnormality seen. Moderate chronic maxillary sinusitis. XR CHEST (2 VW)   Final Result   No acute cardiopulmonary disease.              Scheduled and prn Medications:    Scheduled Meds:   sodium chloride flush  10 mL Intravenous 2 times per day    enoxaparin  40 mg Subcutaneous Daily    latanoprost  1 drop Both Eyes Nightly    cefTRIAXone (ROCEPHIN) IV  1,000 mg Intravenous Q24H     Continuous Infusions:  PRN Meds:.sodium chloride flush, potassium chloride, magnesium sulfate, promethazine **OR** ondansetron, magnesium hydroxide, acetaminophen **OR** acetaminophen    Assessment & Plan:        UTI  - frequency and burning  - antibiotics - Rocephin (day 2/3 antibx -> 3/24)  - I/O  - u/a + leuks/ small nitrites  - watch cx     Acute metabolic encephalopathy- possibly worsened by acute UTI  - baseline confusion becoming worse   - CT head neg acute findings  - baseline?  - lives independently in \"senior building\"  - need placement on d/c?  - social work consult for dispo planning  - daughter endorses worsening confusion lately   - wants new placement     HTN  - cont home med:   - HCTZ has not taken since 2019 - -140 here  - hx noncompliance and stopping meds on his own for unknown reasons  - monitor VS and restart HCTZ if needede      Dementia   - placement on discharge to increased level of care?  - lives alone in \"senior building\"  - social work consulted     Continue current regimen/therapies. Monitor. Adjust medical regimen as appropriate. Body mass index is 23.31 kg/m². The patient and / or the family were informed of the results of any tests, a time was given to answer questions, a plan was proposed and they agreed with plan.       DVT ppx: lovenox      Diet: DIET GENERAL;    Consults:  IP CONSULT TO SOCIAL WORK  IP CONSULT TO SOCIAL WORK    DISPO/placement plan: ready to d/c to facility     Code Status: Full Code      TOÑA Gallegos - HAYLEE  03/25/21

## 2021-03-25 NOTE — DISCHARGE SUMMARY
Hospital Medicine Discharge Summary    Patient ID: Tresa Pineda      Patient's PCP: Saida Wilder MD    Admit Date: 3/23/2021     Discharge Date:   3/25/21    Admitting Physician: Mary Baer MD     Discharge Physician: TOÑA Everett - CNP       Discharge Diagnoses: Active Hospital Problems    Diagnosis Date Noted    UTI (urinary tract infection) [N39.0] 03/23/2021       The patient was seen and examined on day of discharge and this discharge summary is in conjunction with any daily progress note from day of discharge.     Disposition:  [x] Home  [] Home with home health [] Rehab [] Psych [] SNF  [] LTAC  [] Long term nursing home or group home [] Transfer to ICU  [] Transfer to PCU [] Other:      Discharge Instructions/Follow-up:  PCP 2-3 days     D/C condition: stable    Code status: full    Activity: ad taj     Diet: general    D/C Meds: cefdinir on discharge    Hospital Course: Patient is a 59-year-old male with past medical history of hypertension who presents to the hospital due to concern of altered mental status from home.  Patient did show up to the emergency department at the request of patient's daughter. Mane Lopez mentions he has been having frequency urination and burning sensation otherwise denies chest pain shortness of breath nausea vomiting diarrhea constipation.  According to the call received by EMS patient was found confused and was recommended to come to the emergency department.      UTI  - frequency and burning  - antibiotics - Rocephin (day 2/3 antibx -> 3/24) - complete therapy with a few more days of cefdinir on discharge  - I/O  - u/a + leuks/ small nitrites  - watch cx     Acute metabolic encephalopathy- possibly worsened by acute UTI  - baseline confusion becoming worse   - CT head neg acute findings  - baseline?  - lives independently in \"senior building\"  - need placement on d/c?  - social work consult for dispo planning  - daughter endorses worsening confusion lately   - wants new placement     HTN  - cont home med:   - HCTZ has not taken since 2019 - -140 here  - hx noncompliance and stopping meds on his own for unknown reasons  - monitor VS and restart HCTZ if needede      Dementia   - placement on discharge to increased level of care?  - lives alone in \"senior building\"  - social work consulted   - living with niece now until more permanent plans can be made per pt's daughter      Exam:     /67   Pulse 65   Temp 97.7 °F (36.5 °C) (Oral)   Resp 16   Ht 5' 4\" (1.626 m)   Wt 135 lb 12.9 oz (61.6 kg)   SpO2 98%   BMI 23.31 kg/m²   General appearance:   Very anxious and agitated,  appears stated age. Cooperative. HEENT:  Normocephalic, atraumatic. PERRLA.  EOMi.  Conjunctivae/corneas clear, no icterus, non-injected. Neck: Supple, with full range of motion. No jugular venous distention. Trachea midline. Respiratory:  Normal respiratory effort. Clear to auscultation, bilaterally without Rales/Wheezes/Rhonchi. Cardiovascular:  Regular rate and rhythm without murmurs, rubs or gallops. Abdomen: Soft, non-tender, non-distended, without rebound or guarding. Normal bowel sounds. Musculoskeletal:  No clubbing, cyanosis or edema bilaterally.  Full range of motion without deformity. Skin: Skin color, texture, turgor normal.  No rashes or lesions. Neurologic:  Neurovascularly intact without any focal sensory/motor deficits. Cranial nerves: II-XII intact, grossly intact. No facial asymmetry, tongue midline.    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       Mild atrophy and moderate chronic microischemic disease throughout the deep   white matter with no acute abnormality seen.       Moderate chronic maxillary sinusitis.           XR CHEST (2 VW)   Final Result   No acute cardiopulmonary disease.                 Labs: For convenience and continuity at follow-up the following most recent labs are provided:      CBC:    Lab Results   Component Value Date    WBC 6.0 03/25/2021    HGB 12.8 03/25/2021    HCT 39.3 03/25/2021     03/25/2021       Renal:    Lab Results   Component Value Date     03/25/2021    K 4.3 03/25/2021     03/25/2021    CO2 22 03/25/2021    BUN 29 03/25/2021    CREATININE 1.3 03/25/2021    CALCIUM 9.2 03/25/2021       Discharge Medications:     Current Discharge Medication List           Details   cefdinir (OMNICEF) 300 MG capsule Take 1 capsule by mouth 2 times daily for 5 days  Qty: 10 capsule, Refills: 0              Details   latanoprost (XALATAN) 0.005 % ophthalmic solution              Time Spent on discharge is more than 30 minutes in the examination, evaluation, counseling and review of medications and discharge plan. Signed:    Chanell Bautista, TOÑA - CNP   3/25/2021      Thank you Joselyn Henderson MD for the opportunity to be involved in this patient's care. If you have any questions or concerns please feel free to contact me at [phone].

## 2021-03-27 LAB
BLOOD CULTURE, ROUTINE: NORMAL
CULTURE, BLOOD 2: NORMAL

## 2021-04-01 ENCOUNTER — TELEPHONE (OUTPATIENT)
Dept: PRIMARY CARE CLINIC | Age: 86
End: 2021-04-01

## 2021-04-01 NOTE — TELEPHONE ENCOUNTER
----- Message from 2 [facility] sent at 3/30/2021  3:32 PM EDT -----  Subject: Referral Request    QUESTIONS   Reason for referral request? patient discharged from [facility] 3/23 and   was recommended to get physical therapy     Has the physician seen you for this condition before? No   Preferred Specialist (if applicable)? Do you already have an appointment scheduled? Additional Information for Provider? please refer patient to physical   therapy if advised   ---------------------------------------------------------------------------  --------------  CALL BACK INFO  What is the best way for the office to contact you? OK to leave message on   voicemail  Preferred Call Back Phone Number?  [phone]

## 2021-04-01 NOTE — TELEPHONE ENCOUNTER
Reviewed recent notes from hosp for uti. Did not see rec for PT but give her a fu visit and we can evaluate  For possible PT at that time.

## 2021-04-22 PROBLEM — N39.0 UTI (URINARY TRACT INFECTION): Status: RESOLVED | Noted: 2021-03-23 | Resolved: 2021-04-22

## 2021-08-31 ENCOUNTER — OFFICE VISIT (OUTPATIENT)
Dept: PRIMARY CARE CLINIC | Age: 86
End: 2021-08-31
Payer: OTHER GOVERNMENT

## 2021-08-31 VITALS
DIASTOLIC BLOOD PRESSURE: 73 MMHG | BODY MASS INDEX: 24.59 KG/M2 | WEIGHT: 144 LBS | SYSTOLIC BLOOD PRESSURE: 147 MMHG | HEIGHT: 64 IN | HEART RATE: 76 BPM | TEMPERATURE: 97.1 F

## 2021-08-31 DIAGNOSIS — M79.604 PAIN IN BOTH LOWER EXTREMITIES: ICD-10-CM

## 2021-08-31 DIAGNOSIS — L60.0 INGROWN RIGHT BIG TOENAIL: ICD-10-CM

## 2021-08-31 DIAGNOSIS — M79.605 PAIN IN BOTH LOWER EXTREMITIES: ICD-10-CM

## 2021-08-31 DIAGNOSIS — M48.062 LUMBAR STENOSIS WITH NEUROGENIC CLAUDICATION: Primary | ICD-10-CM

## 2021-08-31 DIAGNOSIS — R41.3 MEMORY IMPAIRMENT: ICD-10-CM

## 2021-08-31 PROCEDURE — 99214 OFFICE O/P EST MOD 30 MIN: CPT | Performed by: INTERNAL MEDICINE

## 2021-08-31 RX ORDER — MEMANTINE HYDROCHLORIDE 5 MG/1
5 TABLET ORAL 2 TIMES DAILY
Qty: 60 TABLET | Refills: 5 | Status: SHIPPED | OUTPATIENT
Start: 2021-08-31 | End: 2021-09-26

## 2021-08-31 RX ORDER — DONEPEZIL HYDROCHLORIDE 10 MG/1
10 TABLET, FILM COATED ORAL NIGHTLY
Qty: 30 TABLET | Refills: 5 | Status: SHIPPED | OUTPATIENT
Start: 2021-08-31 | End: 2021-09-26

## 2021-08-31 ASSESSMENT — PATIENT HEALTH QUESTIONNAIRE - PHQ9
SUM OF ALL RESPONSES TO PHQ QUESTIONS 1-9: 0
2. FEELING DOWN, DEPRESSED OR HOPELESS: 0
SUM OF ALL RESPONSES TO PHQ QUESTIONS 1-9: 0
1. LITTLE INTEREST OR PLEASURE IN DOING THINGS: 0
SUM OF ALL RESPONSES TO PHQ QUESTIONS 1-9: 0
SUM OF ALL RESPONSES TO PHQ9 QUESTIONS 1 & 2: 0

## 2021-08-31 ASSESSMENT — ENCOUNTER SYMPTOMS
EYE DISCHARGE: 0
RESPIRATORY NEGATIVE: 1
EYES NEGATIVE: 1

## 2021-08-31 NOTE — PATIENT INSTRUCTIONS
I am starting you on Namenda and Aricept as directed to help retard further memory loss. I would also like for the care coordinator to follow you on a regular basis and for home health to get involved in your care. You cannot drive anymore because you are getting lost and posing a danger to self and others with your driving. I have referred you to physical therapy for your leg weakness and pain. I have also referred you to the podiatrist to take a look at the ingrown toenail on your right great toe.

## 2021-08-31 NOTE — LETTER
[facility]  [id number]  Phone: [phone]  Fax: [phone]    Latesha Palma MD        August 31, 2021     Patient: Victor Manuel Lanier   YOB: 1934   Date of Visit: 8/31/2021       To Whom it May Concern:    Victor Manuel Lanier was seen in my clinic on 8/31/2021. Due to memory loss patient no longer able to drive. If you have any questions or concerns, please don't hesitate to call.     Sincerely,         Latesha Palma MD

## 2021-08-31 NOTE — PROGRESS NOTES
Arturo Jacobson (:  1934) is a 80 y.o. male,Established patient, here for evaluation of the following chief complaint(s):  Leg Pain    The patient's daughter who does not live in [place] was with the patient today after coming to town because the patient got lost for over 10 hours after leaving in the morning to go on iron. Currently this was the first time that this was noticed but I suspect that the patient has gotten lost before. The daughter has already taken his keys away. And he was instructed today that he cannot drive anymore. He has a history of memory impairment and I started him on Aricept and Namenda today. He has pain in both extremities and has had this 1 for over 10 years. The daughter is requesting a referral to physical therapy. That was done today. He has an ingrown right big toenail and he was referred to podiatry. ASSESSMENT/PLAN:  1. Pain in both lower extremities  -     [address] [facility]  2. Memory impairment  -     [facility]  -     External Referral To Home Health  -     donepezil (ARICEPT) 10 MG tablet; Take 1 tablet by mouth nightly, Disp-30 tablet, R-5Normal  -     memantine (NAMENDA) 5 MG tablet; Take 1 tablet by mouth 2 times daily, Disp-60 tablet, R-5Normal  3. Ingrown right big toenail  -     Carlos Maynard., DPM, Podiatry, [place]      No follow-ups on file. Subjective   SUBJECTIVE/OBJECTIVE:  Leg Pain   The incident occurred more than 1 week ago. There was no injury mechanism. The pain is present in the left leg and right leg. The quality of the pain is described as cramping. Neurologic Problem  The patient's primary symptoms include memory loss. This is a chronic problem. Pertinent negatives include no confusion. Review of Systems   Constitutional: Negative for activity change and appetite change. HENT: Negative. Eyes: Negative. Negative for discharge.    Respiratory: Negative. Genitourinary: Negative for difficulty urinating. Musculoskeletal: Negative for arthralgias. Skin: Negative for rash. Neurological: Negative. Psychiatric/Behavioral: Positive for memory loss. Negative for agitation and confusion. The patient is not nervous/anxious. All other systems reviewed and are negative. Objective   Physical Exam  Constitutional:       Appearance: He is well-developed. HENT:      Head: Normocephalic and atraumatic. Eyes:      Conjunctiva/sclera: Conjunctivae normal.      Pupils: Pupils are equal, round, and reactive to light. Cardiovascular:      Rate and Rhythm: Normal rate and regular rhythm. Heart sounds: Normal heart sounds. Pulmonary:      Effort: Pulmonary effort is normal.      Breath sounds: Normal breath sounds. Musculoskeletal:         General: Normal range of motion. Cervical back: Normal range of motion and neck supple. Skin:     General: Skin is warm and dry. Neurological:      Mental Status: He is alert and oriented to person, place, and time. Psychiatric:         Behavior: Behavior normal.         Thought Content: Thought content normal.            No problem-specific Assessment & Plan notes found for this encounter. On this date 8/31/2021 I have spent 35 minutes reviewing previous notes, test results and face to face with the patient discussing the diagnosis and importance of compliance with the treatment plan as well as documenting on the day of the visit. An electronic signature was used to authenticate this note.     --Nati Machado MD

## 2021-09-01 ENCOUNTER — CARE COORDINATION (OUTPATIENT)
Dept: CARE COORDINATION | Age: 86
End: 2021-09-01

## 2021-09-01 NOTE — CARE COORDINATION
Ambulatory Care Coordination Note  9/1/2021  CM Risk Score: 0  Charlson 10 Year Mortality Risk Score: 47%     ACC: Araceli Cedeño RN    Summary Note:   Received message from PCP/Office to reach out to family to assist with possible home health needs. Out reach call placed to pt/ daughter julian, introduced self and reason for call and care coordination program, pt/ family agreeable to care coordination  Per daughter pt uses no assistive devices but tend to \" wall Walk\" and c/o leonel leg pain which is a chronic issue. Daughter states pt needs assistance with taking meds,light housekeeping and and meal prep. Discusses skilled vs non skilled home care services and family agreeable to ACM out reaching to agencies for assistance. Plan  Follow up with family regarding home health services  F/u on pt referral  Fall prevention  Education      Ambulatory Care Coordination Assessment    Care Coordination Protocol  Program Enrollment: Rising Risk  Referral from Primary Care Provider: Yes  Week 1 - Initial Assessment     Do you have all of your prescriptions and are they filled?: Yes  Barriers to medication adherence: Forgets to take  Are you able to afford your medications?: Yes  How often do you have trouble taking your medications the way you have been told to take them?: Sometimes I take them as prescribed. Do you have Home O2 Therapy?: No      Ability to seek help/take action for Emergent Urgent situations i.e. fire, crime, inclement weather or health crisis. : Independent  Ability to ambulate to restroom: Independent  Ability handle personal hygeine needs (bathing/dressing/grooming):  Independent  Ability to manage Medications: Needs Assistance  Ability to prepare Food Preparation: Needs Assistance  Ability to maintain home (clean home, laundry): Needs Assistance  Ability to drive and/or has transportation: Needs Assistance  Ability to do shopping: Needs Assistance  Ability to manage finances: Needs Assistance  Is patient able to live independently?: Yes     Current Housing: Private Residence        Per the Fall Risk Screening, did the patient have 2 or more falls or 1 fall with injury in the past year?: No     Frequent urination at night?: No  Do you use rails/bars?: No  Do you have a non-slip tub mat?: No     Are you experiencing loss of meaning?: No  Are you experiencing loss of hope and peace?: No     Suggested Interventions and Community Resources  Fall Risk Prevention: Not Started Meals on Wheels: Not Started   Physical Therapy: Not Started                  Prior to Admission medications    Medication Sig Start Date End Date Taking? Authorizing Provider   donepezil (ARICEPT) 10 MG tablet Take 1 tablet by mouth nightly 8/31/21   Maritza De Oliveira MD   memantine [facility]) 5 MG tablet Take 1 tablet by mouth 2 times daily 8/31/21   Maritza De Oliveira MD   latanoprost Darguido Pollard) 0.005 % ophthalmic solution  5/19/12   Historical Provider, MD       No future appointments.    and   General Assessment    Do you have any symptoms that are causing concern?: Yes  Progression since Onset: Gradually Worsening  Reported Symptoms: Weakness, Other (Comment: some unsteadiness)

## 2021-09-03 ENCOUNTER — CARE COORDINATION (OUTPATIENT)
Dept: CARE COORDINATION | Age: 86
End: 2021-09-03

## 2021-09-03 NOTE — CARE COORDINATION
[facility] so second call placed, spoke with [facility] in intake dept who states unsure if they have a nurse in patient area as they are currently short staffed, but we can send demo over and they will check staffing for that area. Acm also contacted [facility] and they accept patients insurance and has nurse in pt area. Will contact office staff to fax referral over.   Plan:  Follow up with [facility] as no return call from them yet  Follow up with pt/elma

## 2021-09-08 ENCOUNTER — CARE COORDINATION (OUTPATIENT)
Dept: CARE COORDINATION | Age: 86
End: 2021-09-08

## 2021-09-08 NOTE — CARE COORDINATION
Ambulatory Care Coordination Note  9/8/2021  CM Risk Score: 0  Charlson 10 Year Mortality Risk Score: 47%     ACC: Araceli Cedeño RN    Summary Note:   ACM out  Reach call placed to pt daughter julian who states she has returned home but she does not thhink home health has reached out to pt yet. States pt has been doing well as far as she can tell from speaking with him  Call placed to pt next to follow up on home health, phone just rang no vm picked up will attempt again later        Care Coordination Interventions    Program Enrollment: Rising Risk  Referral from Primary Care Provider: Yes  Suggested Interventions and Community Resources  Fall Risk Prevention: Not Started  Meals on Wheels: Not Started  Physical Therapy: Not Started         Goals Addressed    None         Prior to Admission medications    Medication Sig Start Date End Date Taking? Authorizing Provider   donepezil (ARICEPT) 10 MG tablet Take 1 tablet by mouth nightly 8/31/21   Prasanna Webster MD   [facility]) 5 MG tablet Take 1 tablet by mouth 2 times daily 8/31/21   Prasanna Webster MD   latanoprost Jen Primes) 0.005 % ophthalmic solution  5/19/12   Historical Provider, MD       No future appointments.    and   General Assessment

## 2021-09-16 ENCOUNTER — CARE COORDINATION (OUTPATIENT)
Dept: CARE COORDINATION | Age: 86
End: 2021-09-16

## 2021-09-16 NOTE — CARE COORDINATION
Call placed [facility],  To follow up if agency has been in contact with pt/ family. Spoke with Riesa Shone who states they have not received referral yet.   Will contact office pool again to request info be sent to local office for faster service @ [phone]

## 2021-09-20 ENCOUNTER — CARE COORDINATION (OUTPATIENT)
Dept: CARE COORDINATION | Age: 86
End: 2021-09-20

## 2021-09-20 NOTE — CARE COORDINATION
Call placed to [facility] to follow up on referral. , spoke with Rosa Desai in intake and they have not received referral yet. Will reach out to office staff/ mgr for assistance with faxing referral documents anf then follow up with agency again .  Will update family

## 2021-09-24 ENCOUNTER — TELEPHONE (OUTPATIENT)
Dept: PRIMARY CARE CLINIC | Age: 86
End: 2021-09-24

## 2021-09-28 ENCOUNTER — TELEPHONE (OUTPATIENT)
Dept: PRIMARY CARE CLINIC | Age: 86
End: 2021-09-28

## 2021-10-05 ENCOUNTER — CARE COORDINATION (OUTPATIENT)
Dept: CARE COORDINATION | Age: 86
End: 2021-10-05

## 2021-10-05 ENCOUNTER — TELEPHONE (OUTPATIENT)
Dept: PRIMARY CARE CLINIC | Age: 86
End: 2021-10-05

## 2021-10-05 NOTE — TELEPHONE ENCOUNTER
----- Message from [employer] sent at 10/5/2021  9:07 AM EDT -----  Subject: Message to Provider    QUESTIONS  Information for Provider? Daughter Guille Boss calling to check status of   in home care for her father. They have not been contacted.  ---------------------------------------------------------------------------  --------------  CALL BACK INFO  What is the best way for the office to contact you? OK to leave message on   voicemail  Preferred Call Back Phone Number? [phone]  ---------------------------------------------------------------------------  --------------  SCRIPT ANSWERS  Relationship to Patient? Other  Representative Name? Guille Boss - daughter  Is the Representative on the appropriate HIPAA document in Epic?  Yes

## 2021-10-06 ENCOUNTER — TELEPHONE (OUTPATIENT)
Dept: PRIMARY CARE CLINIC | Age: 86
End: 2021-10-06

## 2021-10-06 NOTE — CARE COORDINATION
Call placed to provider services again and was spoke with three different departments. Once correct brach located, writer was informed that pt insurance covers medicare part A @ 100% and that if provider or service company accepts medicare A than he should be accepted. Again was told they have no list of Home care companies to provide.  Will continue to assist with locating home care provider

## 2021-10-06 NOTE — CARE COORDINATION
Spoke with daughter julian discussed that previos agency referral sent to does not accept pt insurance and writer called pt insurance and they do not have a list of home helath providers and that writer can call different agencies to inquire if they can accept, also as another option MD could do a Prior Authorization to see if insurance would be willing to take pt. Received message on vm  from john at [facility] carriers with a number writer could try for further assistance. Daughter then asks if [facility] as [facility] is a , call placed to [facility] health and spoke with Grace who states they do have a home care division but they only accept orders from [place] providers and that pt could have a [place] provider as well as his PCP .   info shared with julian Naylor will follow up with with local agencies and provider services number provided

## 2021-10-06 NOTE — TELEPHONE ENCOUNTER
----- Message from Stanley Chacon RN sent at 10/5/2021  1:41 PM EDT -----  [place]  I just saw that [facility] does not accept his insurance, so I reached out to his insurance for a list of agencies they contract with in area, and apparently they only have hospitals listed, I was told to call around and if none takes his insurance MD can do Prior Authorization to see if they can get it covered through one of the non contracted agencies  Will contact a couple to see what we come up with

## 2021-10-06 NOTE — CARE COORDINATION
Call placed to [facility] , they have received referral and has reached out to pt, and is scheduled to seee him on the 9th of this month for [facility]. Information reviewed with daughter julian.  Will also send referral for [employer] for non skilled assistance with home making

## 2021-10-11 ENCOUNTER — CARE COORDINATION (OUTPATIENT)
Dept: CARE COORDINATION | Age: 86
End: 2021-10-11

## 2021-10-11 NOTE — CARE COORDINATION
Received a call from Ms Issac Rodriguez from [facility], to update ACM that assessment was completed with pt daughter and they are planning to enroll him with [facility].  [facility] and [facility], also requesting a copy of pt med list will have office send copy

## 2021-10-13 ENCOUNTER — TELEPHONE (OUTPATIENT)
Dept: PRIMARY CARE CLINIC | Age: 86
End: 2021-10-13

## 2021-10-19 ENCOUNTER — TELEPHONE (OUTPATIENT)
Dept: PRIMARY CARE CLINIC | Age: 86
End: 2021-10-19

## 2021-10-19 NOTE — TELEPHONE ENCOUNTER
Spoke with patient , urged him to take his memory pills every day  He apparently lives alone  Spoke with visiting nurse also  Pt agrees to take his meds

## 2021-10-21 ENCOUNTER — CARE COORDINATION (OUTPATIENT)
Dept: CARE COORDINATION | Age: 86
End: 2021-10-21

## 2021-10-21 NOTE — CARE COORDINATION
Ambulatory Care Coordination Note  10/21/2021  CM Risk Score: 0  Charlson 10 Year Mortality Risk Score: 47%     ACC: Araceli Cedeño RN    Summary Note:   Acm out reach call placed to pt to assess status and resource needs. Pt reports nurse comes to see him, insure if [facility] has been in touch with him. Reminded pt to take his meds daily , which he states he has, but some confusion noted,so unsure of accuracy, pt fixated on \" car being down\" and trying to find someone to fix it because the door wont open\" per previous conversation with daughter julian, she has taken his keys . Pt seemed unaware of fact. Will follow up with pt daughter to assess if all needed resources in place      Care Coordination Interventions    Program Enrollment: Rising Risk  Referral from Primary Care Provider: Yes  Suggested Interventions and Community Resources  Fall Risk Prevention: Not Started  Meals on Wheels: Not Started  Physical Therapy: Not Started         Goals Addressed    None         Prior to Admission medications    Medication Sig Start Date End Date Taking? Authorizing Provider   memantine (NAMENDA) 5 MG tablet TAKE 1 TABLET BY MOUTH TWICE A DAY 9/26/21   Shailesh De Leon MD   donepezil (ARICEPT) 10 MG tablet TAKE 1 TABLET BY MOUTH EVERY DAY AT NIGHT 9/26/21   Shailesh De Leon MD   latanoprost Hollis Hitch) 0.005 % ophthalmic solution  5/19/12   Historical Provider, MD       No future appointments.

## 2021-11-05 ENCOUNTER — TELEPHONE (OUTPATIENT)
Dept: PRIMARY CARE CLINIC | Age: 86
End: 2021-11-05

## 2021-11-05 ENCOUNTER — CARE COORDINATION (OUTPATIENT)
Dept: CARE COORDINATION | Age: 86
End: 2021-11-05

## 2021-11-05 NOTE — TELEPHONE ENCOUNTER
[address]                               SLEEP STUDY REPORT    PATIENT NAME: Ronni Hernandez                     :        1968  MED REC NO:   [id number]                            ROOM:  ACCOUNT NO:   [de-identified]                           ADMIT DATE: 2019  PROVIDER:     Tremayne Hackett MD    DATE OF STUDY:  2019    APNEA LINK STUDY    ATTENDING:  Zach Kramer MD    SLEEP SPECIALIST:  Tremayne Hackett MD    Clinically, there are concerns suggestive of sleep apnea with a weight  of 185 pounds, BMI of 28. RECORDIN hours 57 minutes. EVALUATION:  7 hours 45 minutes. AHI of 14, RI of 17, TARUN of 14. CONCLUSION:  In conclusion, this test does document evidence of mild  obstructive sleep apnea. With this finding and the clinical  presentation, I will recommend avoidance of sedatives, narcotics,  hypnotics, or any other substances. Further recommendations as per his  ENT physician.         Marina Wright MD    D: 2019 12:17:48       T: 2019 13:49:13     [email or web address]  Job#: [id number]     Doc#: [id number]    CC: Farzad Zelaya see if Dr Joyce Eid can handle this since he saw the patient on the august visit, I informed kike dr Katja Adhikari would be in on next Tuesday,  If there is an issue let me know

## 2021-11-05 NOTE — TELEPHONE ENCOUNTER
Patient denied to make a appt. For his re-certification for his home health so now [facility] has discharged him and in order for them to get paid from medicare the office visit note from 8/31/21 needs to states that he was actually being seen for Lumbar Stenosis with neurogenic claudication in that exact wording wants to know if you can change the wording for billing purposes for medicare if you have any questions you can all Akanksha Moreno at [phone].

## 2021-11-05 NOTE — CARE COORDINATION
Ambulatory Care Coordination Note  11/5/2021  CM Risk Score: 0  Charlson 10 Year Mortality Risk Score: 47%     ACC: Araceli Cedeño RN    Summary Note:   Acm out reach call placed , pt reports doing well states taking meds as prescribed when asked, not sure if this is actually the case, still concerned about care being down, and plans for that. Call placed to daughter to follow up on any other resource needs or concerns for father, no answer to her phone, message left with contact info. Will attempt again at later date        Care Coordination Interventions    Program Enrollment: Rising Risk  Referral from Primary Care Provider: Yes  Suggested Interventions and Community Resources  Fall Risk Prevention: Not Started  Meals on Wheels: Not Started  Physical Therapy: Not Started         Goals Addressed    None         Prior to Admission medications    Medication Sig Start Date End Date Taking? Authorizing Provider   memantine (NAMENDA) 5 MG tablet TAKE 1 TABLET BY MOUTH TWICE A DAY 9/26/21   Kami Muniz MD   donepezil (ARICEPT) 10 MG tablet TAKE 1 TABLET BY MOUTH EVERY DAY AT NIGHT 9/26/21   Kami Muniz MD   latanoprost [facility]) 0.005 % ophthalmic solution  5/19/12   Historical Provider, MD       No future appointments.

## 2021-11-11 NOTE — TELEPHONE ENCOUNTER
Denis Paul with [facility] called back in to follow up with his original message from 11/5/21. He adv that he spoke with Dr. Gustavo Briscoe last night who informed him that it was Dr. Lawanda Moraes who saw PT back in August and therefore he would not be able to make any changes to the OV notes that Denis Paul is needing updated for their billing purposes. Denis Paul would like to know if Dr. Lawanda Moraes has had a chance to look into this because on their end they are only given 30 days to have these addressed and tomorrow would be the 30th day.      Please call Denis Paul back at: [phone]

## 2021-11-15 ENCOUNTER — TELEPHONE (OUTPATIENT)
Dept: PRIMARY CARE CLINIC | Age: 86
End: 2021-11-15

## 2021-11-15 NOTE — TELEPHONE ENCOUNTER
Stanley Robles called in From [facility] to see if  noted that Mr. Tonia Milner has Spinal stenosis and would like this added to his notes please be advise  Please call kavitha if any question at [phone]

## 2021-11-17 NOTE — TELEPHONE ENCOUNTER
I previously adjusted diagnoses for the visit in the question. I wont change the patients record again.

## 2021-12-15 ENCOUNTER — CARE COORDINATION (OUTPATIENT)
Dept: CARE COORDINATION | Age: 86
End: 2021-12-15

## 2021-12-15 NOTE — CARE COORDINATION
Attempted to reach pt for cc follow up number has been disconnected, unable to reach any of the listed numbers.

## 2022-01-06 ENCOUNTER — CARE COORDINATION (OUTPATIENT)
Dept: CARE COORDINATION | Age: 87
End: 2022-01-06

## 2022-01-06 NOTE — CARE COORDINATION
Attempted to reach pt again and number is not in service.  Will end this eipsode as unable to reach pt

## 2022-06-22 DIAGNOSIS — R41.3 MEMORY IMPAIRMENT: ICD-10-CM

## 2022-06-22 RX ORDER — DONEPEZIL HYDROCHLORIDE 10 MG/1
TABLET, FILM COATED ORAL
Qty: 90 TABLET | Refills: 1 | OUTPATIENT
Start: 2022-06-22

## 2022-06-22 RX ORDER — MEMANTINE HYDROCHLORIDE 5 MG/1
TABLET ORAL
Qty: 180 TABLET | Refills: 1 | OUTPATIENT
Start: 2022-06-22

## 2022-07-14 DIAGNOSIS — R41.3 MEMORY IMPAIRMENT: ICD-10-CM

## 2022-07-14 RX ORDER — MEMANTINE HYDROCHLORIDE 5 MG/1
TABLET ORAL
Qty: 180 TABLET | Refills: 1 | OUTPATIENT
Start: 2022-07-14

## 2022-07-14 RX ORDER — DONEPEZIL HYDROCHLORIDE 10 MG/1
TABLET, FILM COATED ORAL
Qty: 90 TABLET | Refills: 1 | OUTPATIENT
Start: 2022-07-14

## 2022-11-18 ENCOUNTER — TELEPHONE (OUTPATIENT)
Dept: PRIMARY CARE CLINIC | Age: 87
End: 2022-11-18

## 2022-11-18 NOTE — TELEPHONE ENCOUNTER
Farheen with the [facility] called in regarding PT. They are looking for OV notes from Dr. Elizabeth Soto with the diagnosis of dementia (date of diagnosis) and medications that PT was prescribed or taken for that diagnosis. Please fax to: [phone] attn: Monica Sewell.      Best call back number: [phone]

## 2022-11-23 ENCOUNTER — TELEPHONE (OUTPATIENT)
Dept: PRIMARY CARE CLINIC | Age: 87
End: 2022-11-23

## 2022-11-23 NOTE — TELEPHONE ENCOUNTER
----- Message from [place], [place] sent at 11/22/2022 10:45 AM EST -----  Subject: Message to Provider    QUESTIONS  Information for Provider? Farheen from [address] [place] [address] did not receive the   8/31/21 OV at [facility] attn? Farheen. and request this be sent again. Please follow up at [phone]  ---------------------------------------------------------------------------  --------------  [address] [employer]  [phone]; OK to leave message on voicemail  ---------------------------------------------------------------------------  --------------  SCRIPT ANSWERS  Relationship to Patient? Third Party  Third Party Type? Hospital?   Representative Name?  [employer]

## 2023-11-14 NOTE — PATIENT INSTRUCTIONS
of sodium is given in the list under the title. It is given in milligrams (mg). ? Check the serving size carefully. A single serving is often very small, and you may eat more than one serving. If this is the case, you will eat more sodium than listed on the label. For example, if the serving size for a canned soup is 1 cup and the sodium amount is 470 mg, if you have 2 cups you will eat 940 mg of sodium. · The nutrition facts for fresh fruits and vegetables are not listed on the food. They may be listed somewhere in the store. These foods usually have no sodium or low sodium. · The Nutrition Facts label also gives you the Percent Daily Value for sodium. This is how much of the recommended amount of sodium a serving contains. The daily value for sodium is less than 2,300 mg. So if the Percent Daily Value says 50%, this means one serving is giving you half of this, or 1,150 mg. Buy low-sodium foods  · Look for foods that are made with less sodium. Watch for the following words on the label. ? \"Unsalted\" means there is no sodium added to the food. But there may be sodium already in the food naturally. ? \"Sodium-free\" means a serving has less than 5 mg of sodium. ? \"Very low sodium\" means a serving has 35 mg or less of sodium. ? \"Low-sodium\" means a serving has 140 mg or less of sodium. · \"Reduced-sodium\" means that there is 25% less sodium than what the food normally has. This is still usually too much sodium. Try not to buy foods with this on the label. · Buy fresh vegetables, or frozen vegetables without added sauces. Buy low-sodium versions of canned vegetables, soups, and other canned goods. Where can you learn more? Go to https://WHI SolutionjeimyMediConecta.com.[employer]. org and sign in to your [employer] account. Enter 30 [id number] in the [facility] box to learn more about \"How to Read a Food Label to Limit Sodium: Care Instructions. \"     If you do not have an account, please click on the \"Sign Up Now\" versions of canned vegetables, soups, and other canned goods. Where can you learn more? Go to https://chpepiceweb.[employer]. org and sign in to your Perosphere account. Enter 26 [id number] in the [facility] box to learn more about \"How to Read a Food Label to Limit Sodium: Care Instructions. \"     If you do not have an account, please click on the \"Sign Up Now\" link. Current as of: November 7, 2018  Content Version: 12.1  © 4725-4877 Healthwise, Incorporated. Care instructions adapted under license by [facility] ([facility]). If you have questions about a medical condition or this instruction, always ask your healthcare professional. [address] any warranty or liability for your use of this information. Detail Level: Detailed Size Of Lesion: 5 x 4 mm brown macule (stable)

## 2025-03-10 ENCOUNTER — APPOINTMENT (OUTPATIENT)
Dept: GENERAL RADIOLOGY | Age: 89
DRG: 092 | End: 2025-03-10
Payer: MEDICARE

## 2025-03-10 ENCOUNTER — APPOINTMENT (OUTPATIENT)
Dept: CT IMAGING | Age: 89
DRG: 092 | End: 2025-03-10
Payer: MEDICARE

## 2025-03-10 ENCOUNTER — HOSPITAL ENCOUNTER (INPATIENT)
Age: 89
LOS: 8 days | Discharge: NURSING, REHAB OR LONG-TERM CARE | DRG: 092 | End: 2025-03-18
Attending: EMERGENCY MEDICINE | Admitting: INTERNAL MEDICINE
Payer: MEDICARE

## 2025-03-10 DIAGNOSIS — R41.89 COGNITIVE IMPAIRMENT: Primary | ICD-10-CM

## 2025-03-10 DIAGNOSIS — R47.81 SLURRED SPEECH: ICD-10-CM

## 2025-03-10 PROBLEM — I50.9 ACUTE CHF (CONGESTIVE HEART FAILURE) (HCC): Status: ACTIVE | Noted: 2025-03-10

## 2025-03-10 PROBLEM — F03.C11 SEVERE DEMENTIA WITH AGITATION (HCC): Status: ACTIVE | Noted: 2025-03-10

## 2025-03-10 LAB
ALBUMIN SERPL-MCNC: 4.6 G/DL (ref 3.4–5)
ALBUMIN/GLOB SERPL: 1.3 {RATIO} (ref 1.1–2.2)
ALP SERPL-CCNC: 74 U/L (ref 40–129)
ALT SERPL-CCNC: 28 U/L (ref 10–40)
ANION GAP SERPL CALCULATED.3IONS-SCNC: 11 MMOL/L (ref 3–16)
AST SERPL-CCNC: 30 U/L (ref 15–37)
BASOPHILS # BLD: 0.1 K/UL (ref 0–0.2)
BASOPHILS NFR BLD: 0.7 %
BILIRUB SERPL-MCNC: 0.4 MG/DL (ref 0–1)
BUN SERPL-MCNC: 29 MG/DL (ref 7–20)
CALCIUM SERPL-MCNC: 10.3 MG/DL (ref 8.3–10.6)
CHLORIDE SERPL-SCNC: 105 MMOL/L (ref 99–110)
CO2 SERPL-SCNC: 28 MMOL/L (ref 21–32)
CREAT SERPL-MCNC: 1.2 MG/DL (ref 0.8–1.3)
DEPRECATED RDW RBC AUTO: 15.7 % (ref 12.4–15.4)
EOSINOPHIL # BLD: 0.3 K/UL (ref 0–0.6)
EOSINOPHIL NFR BLD: 3.1 %
FLUAV + FLUBV AG NOSE IA.RAPID: NOT DETECTED
FLUAV + FLUBV AG NOSE IA.RAPID: NOT DETECTED
GFR SERPLBLD CREATININE-BSD FMLA CKD-EPI: 57 ML/MIN/{1.73_M2}
GLUCOSE BLD-MCNC: 98 MG/DL (ref 70–99)
GLUCOSE SERPL-MCNC: 100 MG/DL (ref 70–99)
HCT VFR BLD AUTO: 42.9 % (ref 40.5–52.5)
HGB BLD-MCNC: 14 G/DL (ref 13.5–17.5)
LACTATE BLDV-SCNC: 1.3 MMOL/L (ref 0.4–1.9)
LYMPHOCYTES # BLD: 1.6 K/UL (ref 1–5.1)
LYMPHOCYTES NFR BLD: 18.7 %
MCH RBC QN AUTO: 26.9 PG (ref 26–34)
MCHC RBC AUTO-ENTMCNC: 32.7 G/DL (ref 31–36)
MCV RBC AUTO: 82.5 FL (ref 80–100)
MONOCYTES # BLD: 0.6 K/UL (ref 0–1.3)
MONOCYTES NFR BLD: 6.5 %
NEUTROPHILS # BLD: 6.2 K/UL (ref 1.7–7.7)
NEUTROPHILS NFR BLD: 71 %
NT-PROBNP SERPL-MCNC: 372 PG/ML (ref 0–449)
PERFORMED ON: NORMAL
PLATELET # BLD AUTO: 278 K/UL (ref 135–450)
PMV BLD AUTO: 7.8 FL (ref 5–10.5)
POTASSIUM SERPL-SCNC: 4.4 MMOL/L (ref 3.5–5.1)
PROT SERPL-MCNC: 8.2 G/DL (ref 6.4–8.2)
RBC # BLD AUTO: 5.2 M/UL (ref 4.2–5.9)
SARS-COV-2 RDRP RESP QL NAA+PROBE: NOT DETECTED
SODIUM SERPL-SCNC: 144 MMOL/L (ref 136–145)
TROPONIN, HIGH SENSITIVITY: 21 NG/L (ref 0–22)
WBC # BLD AUTO: 8.7 K/UL (ref 4–11)

## 2025-03-10 PROCEDURE — 36415 COLL VENOUS BLD VENIPUNCTURE: CPT

## 2025-03-10 PROCEDURE — 80053 COMPREHEN METABOLIC PANEL: CPT

## 2025-03-10 PROCEDURE — 84484 ASSAY OF TROPONIN QUANT: CPT

## 2025-03-10 PROCEDURE — 71046 X-RAY EXAM CHEST 2 VIEWS: CPT

## 2025-03-10 PROCEDURE — 83880 ASSAY OF NATRIURETIC PEPTIDE: CPT

## 2025-03-10 PROCEDURE — 83605 ASSAY OF LACTIC ACID: CPT

## 2025-03-10 PROCEDURE — 6360000002 HC RX W HCPCS: Performed by: PHYSICIAN ASSISTANT

## 2025-03-10 PROCEDURE — 99285 EMERGENCY DEPT VISIT HI MDM: CPT

## 2025-03-10 PROCEDURE — 93005 ELECTROCARDIOGRAM TRACING: CPT | Performed by: PHYSICIAN ASSISTANT

## 2025-03-10 PROCEDURE — 96374 THER/PROPH/DIAG INJ IV PUSH: CPT

## 2025-03-10 PROCEDURE — 87502 INFLUENZA DNA AMP PROBE: CPT

## 2025-03-10 PROCEDURE — 87635 SARS-COV-2 COVID-19 AMP PRB: CPT

## 2025-03-10 PROCEDURE — 70450 CT HEAD/BRAIN W/O DYE: CPT

## 2025-03-10 PROCEDURE — 2060000000 HC ICU INTERMEDIATE R&B

## 2025-03-10 PROCEDURE — 85025 COMPLETE CBC W/AUTO DIFF WBC: CPT

## 2025-03-10 RX ORDER — DROPERIDOL 2.5 MG/ML
0.62 INJECTION, SOLUTION INTRAMUSCULAR; INTRAVENOUS ONCE
Status: DISCONTINUED | OUTPATIENT
Start: 2025-03-10 | End: 2025-03-10

## 2025-03-10 RX ORDER — ENOXAPARIN SODIUM 100 MG/ML
40 INJECTION SUBCUTANEOUS DAILY
Status: DISCONTINUED | OUTPATIENT
Start: 2025-03-11 | End: 2025-03-14

## 2025-03-10 RX ORDER — ASPIRIN 81 MG/1
81 TABLET, CHEWABLE ORAL DAILY
Status: DISCONTINUED | OUTPATIENT
Start: 2025-03-10 | End: 2025-03-18 | Stop reason: HOSPADM

## 2025-03-10 RX ORDER — DROPERIDOL 2.5 MG/ML
0.62 INJECTION, SOLUTION INTRAMUSCULAR; INTRAVENOUS ONCE
Status: COMPLETED | OUTPATIENT
Start: 2025-03-10 | End: 2025-03-10

## 2025-03-10 RX ORDER — ONDANSETRON 2 MG/ML
4 INJECTION INTRAMUSCULAR; INTRAVENOUS EVERY 6 HOURS PRN
Status: DISCONTINUED | OUTPATIENT
Start: 2025-03-10 | End: 2025-03-18 | Stop reason: HOSPADM

## 2025-03-10 RX ORDER — ASPIRIN 300 MG/1
300 SUPPOSITORY RECTAL DAILY
Status: DISCONTINUED | OUTPATIENT
Start: 2025-03-10 | End: 2025-03-18 | Stop reason: HOSPADM

## 2025-03-10 RX ORDER — POLYETHYLENE GLYCOL 3350 17 G/17G
17 POWDER, FOR SOLUTION ORAL DAILY PRN
Status: DISCONTINUED | OUTPATIENT
Start: 2025-03-10 | End: 2025-03-18 | Stop reason: HOSPADM

## 2025-03-10 RX ORDER — ATORVASTATIN CALCIUM 80 MG/1
80 TABLET, FILM COATED ORAL NIGHTLY
Status: DISCONTINUED | OUTPATIENT
Start: 2025-03-10 | End: 2025-03-18 | Stop reason: HOSPADM

## 2025-03-10 RX ORDER — LABETALOL HYDROCHLORIDE 5 MG/ML
10 INJECTION, SOLUTION INTRAVENOUS EVERY 10 MIN PRN
Status: DISCONTINUED | OUTPATIENT
Start: 2025-03-10 | End: 2025-03-18 | Stop reason: HOSPADM

## 2025-03-10 RX ORDER — SODIUM CHLORIDE 0.9 % (FLUSH) 0.9 %
5-40 SYRINGE (ML) INJECTION EVERY 12 HOURS SCHEDULED
Status: DISCONTINUED | OUTPATIENT
Start: 2025-03-10 | End: 2025-03-18 | Stop reason: HOSPADM

## 2025-03-10 RX ORDER — LORAZEPAM 2 MG/ML
1 INJECTION INTRAMUSCULAR ONCE
Status: COMPLETED | OUTPATIENT
Start: 2025-03-10 | End: 2025-03-10

## 2025-03-10 RX ORDER — SODIUM CHLORIDE 9 MG/ML
INJECTION, SOLUTION INTRAVENOUS PRN
Status: DISCONTINUED | OUTPATIENT
Start: 2025-03-10 | End: 2025-03-18 | Stop reason: HOSPADM

## 2025-03-10 RX ORDER — SODIUM CHLORIDE, SODIUM LACTATE, POTASSIUM CHLORIDE, CALCIUM CHLORIDE 600; 310; 30; 20 MG/100ML; MG/100ML; MG/100ML; MG/100ML
INJECTION, SOLUTION INTRAVENOUS CONTINUOUS
Status: ACTIVE | OUTPATIENT
Start: 2025-03-10 | End: 2025-03-11

## 2025-03-10 RX ORDER — MEMANTINE HYDROCHLORIDE 10 MG/1
15 TABLET ORAL DAILY
COMMUNITY

## 2025-03-10 RX ORDER — LORAZEPAM 2 MG/ML
4 INJECTION INTRAMUSCULAR EVERY 6 HOURS PRN
Status: DISCONTINUED | OUTPATIENT
Start: 2025-03-10 | End: 2025-03-18 | Stop reason: HOSPADM

## 2025-03-10 RX ORDER — ONDANSETRON 4 MG/1
4 TABLET, ORALLY DISINTEGRATING ORAL EVERY 8 HOURS PRN
Status: DISCONTINUED | OUTPATIENT
Start: 2025-03-10 | End: 2025-03-18 | Stop reason: HOSPADM

## 2025-03-10 RX ORDER — SODIUM CHLORIDE 0.9 % (FLUSH) 0.9 %
5-40 SYRINGE (ML) INJECTION PRN
Status: DISCONTINUED | OUTPATIENT
Start: 2025-03-10 | End: 2025-03-18 | Stop reason: HOSPADM

## 2025-03-10 RX ORDER — MULTIVITAMIN WITH IRON
500 TABLET ORAL DAILY
COMMUNITY

## 2025-03-10 RX ORDER — ACETAMINOPHEN 325 MG/1
650 TABLET ORAL EVERY 4 HOURS PRN
Status: DISCONTINUED | OUTPATIENT
Start: 2025-03-10 | End: 2025-03-18 | Stop reason: HOSPADM

## 2025-03-10 RX ORDER — OLANZAPINE 5 MG/1
10 TABLET, ORALLY DISINTEGRATING ORAL NIGHTLY
Status: DISCONTINUED | OUTPATIENT
Start: 2025-03-10 | End: 2025-03-13

## 2025-03-10 RX ORDER — ACETAMINOPHEN 650 MG/1
650 SUPPOSITORY RECTAL EVERY 4 HOURS PRN
Status: DISCONTINUED | OUTPATIENT
Start: 2025-03-10 | End: 2025-03-18 | Stop reason: HOSPADM

## 2025-03-10 RX ADMIN — DROPERIDOL 0.62 MG: 2.5 INJECTION, SOLUTION INTRAMUSCULAR; INTRAVENOUS at 19:08

## 2025-03-10 RX ADMIN — LORAZEPAM 1 MG: 2 INJECTION INTRAMUSCULAR; INTRAVENOUS at 19:35

## 2025-03-10 ASSESSMENT — PAIN - FUNCTIONAL ASSESSMENT: PAIN_FUNCTIONAL_ASSESSMENT: NONE - DENIES PAIN

## 2025-03-10 NOTE — ED PROVIDER NOTES
[facility] EMERGENCY DEPARTMENT  EMERGENCY DEPARTMENT ENCOUNTER        Pt Name: Junior Krishnamurthy  MRN: [id number]  Birthdate 9/11/1934  Date of evaluation: 3/10/2025  Provider: Dayna Jaramillo PA-C  PCP: No primary care provider on file.  Note Started: 3:32 PM EDT 3/10/25       I have seen and evaluated this patient with my supervising physician Dr. Spain    CHIEF COMPLAINT       Chief Complaint   Patient presents with    no complaints     Pt was dropped off to ED by caregiver.  Pt has no complaints but has history of dementia.         HISTORY OF PRESENT ILLNESS: 1 or more Elements     History From: daughter Ramona Krishnamurthy. Patient has dementia so unable to obtain hx from him    Junior Krishnamurthy is a 89 y.o. male who presents for shakiness and slurred speech.   Daughter spoke with him at 8am this morning and he sounded normal.  Patient called  daughter and was complained of shaking.  Daughter did not notice slurred speech at that time.    EMS came.  Patient refused transport.  Caregiver then noted slurred speech and patient almost fell twice. Caregiver gave Ensure and patient seemed to get better. Patient brought to ED for evaluation.  Daughter denies prior episodes. Patient has hx of dementia so unable to give much hx.  Daughter lives in [place] and is in the process of getting pt moved there so she can take care of him.  Right now,  he lives alone and has caregiver 8a-2p Monday-Friday.  [facility] Patient denies pain.  He does report runny nose today.  Denies cough or congestion.  Denies nausea vomiting abdominal pain fever. Denies numbness, tingling, vision changes. Daughter reports baseline mental status is patient trying to get home.    Nursing Notes were reviewed and agreed with or any disagreements were addressed in the HPI.    REVIEW OF SYSTEMS :      Review of Systems    Positives and Pertinent negatives as per HPI.     SURGICAL HISTORY     Past Surgical History:  Noted.   daughter over the phone.  His neurologic exam is normal.Labs show no significant leukocytosis, anemia, or metabolic abnormality.  CT head negative.  Chest x-ray negative    Upon reevaluation, patient becoming more confused and agitated.  Keeps stating he wants to leave and go home.  He is sundowning. I do not think patient is safe for discharge back to his home where he lives alone.  Additionally, had episode of slurred speech this morning so would benefit from further workup,  medicine consulted.            Chronic Conditions: dementia      I am the Primary Clinician of Record.    Is this patient to be included in the SEP-1 Core Measure due to severe sepsis or septic shock?   No   Exclusion criteria - the patient is NOT to be included for SEP-1 Core Measure due to:  Infection is not suspected    PROCEDURES   Unless otherwise noted below, none     Procedures    FINAL IMPRESSION      1. Cognitive impairment    2. Slurred speech          DISPOSITION/PLAN       DISPOSITION Admitted 03/10/2025 07:25:18 PM               PATIENT REFERRED TO:  No follow-up provider specified.    DISCHARGE MEDICATIONS:  New Prescriptions    No medications on file       DISCONTINUED MEDICATIONS:  Discontinued Medications    DONEPEZIL (ARICEPT) 10 MG TABLET    TAKE 1 TABLET BY MOUTH EVERY DAY AT NIGHT    MEMANTINE (NAMENDA) 5 MG TABLET    TAKE 1 TABLET BY MOUTH TWICE A DAY              (Please note that portions of this note were completed with a voice recognition program.  Efforts were made to edit the dictations but occasionally words are mis-transcribed.)    Dayna Jaramillo PA-C (electronically signed)            Dayna Jaramillo PA-C  03/10/25 1945

## 2025-03-10 NOTE — PROGRESS NOTES
Medication Reconciliation    List of medications patient is currently taking is complete.     Source of information: 1. Conversation with patient's daughter over the phone                                      [facility]. [facility]           Wilfredo Yañez RPH   3/10/2025  5:51 PM

## 2025-03-10 NOTE — ED TRIAGE NOTES
Pt was dropped off to ED by caregiver.  Pt has no complaints but has history of dementia.  Registration was told pt was shaky and  wanted the patient to be \"checked out\"

## 2025-03-10 NOTE — H&P
V2.0  History and Physical      Name:  Junior Krishnamurthy /Age/Sex: 1934  (89 y.o. male)   MRN & CSN:  [id number] & [id number] Encounter Date/Time: 3/10/2025 7:31 PM EDT   Location:  [address] [place] [address] PCP: No primary care provider on file.       Hospital Day: 1    Assessment and Plan:   Junior Krishnamurthy is a 89 y.o. male [facility], severely demented with unknown pmh  who presents with Slurred speech.    Hospital Problems           Last Modified POA    * (Principal) Slurred speech 3/10/2025 Yes    Severe dementia with agitation (HCC) 3/10/2025 Yes     Plan:  Aspirin, statin, permissive hypertension, MRI of the brain, neurology consult, every 4 hours neurochecks  Zyprexa 10 mg nightly  Check troponin and BNP    Disposition:   Current Living situation: Home alone  Expected Disposition: SNF  Estimated D/C: 3 days    Diet Diet NPO   DVT Prophylaxis [] Lovenox, []  Heparin, [x] SCDs, [] Ambulation,  [] Eliquis, [] Xarelto, [] Coumadin   Code Status Full Code   Surrogate Decision Maker/ POA Daughter     Personally reviewed Lab Studies and Imaging     Discussed management of the case with ED provider who recommended admission.    EKG interpreted personally and results normal sinus rhythm with ventricular rate of 75 left anterior fascicular block, QTc 435, no acute ischemic changes.    Imaging that was interpreted personally includes chest x-ray and results increased cardiac silhouette and pulmonary vascular congestion.    Drugs that require monitoring for toxicity include none and the method of monitoring was n/a.        History from:     electronic medical record    History of Present Illness:     Chief Complaint: Slurred speech  Junior Krishnamurthy is a 89 y.o. male [facility] patient with severe dementia with unknown pmh who presents with slurred speech.    In the emergency room his temperature was 36.7, blood pressure 140/81 with a pulse of 97, respirations 17, sats 98% on room air, BMI 24.29.     Review of Systems:        Unable to  obtain due to his advanced dementia.    Objective:   No intake or output data in the 24 hours ending 03/10/25 1931   Vitals:   Vitals:    03/10/25 1342   BP: (!) 140/81   Pulse: 97   Resp: 17   Temp: 98.1 °F (36.7 °C)   TempSrc: Oral   SpO2: 98%   Weight: 64.2 kg (141 lb 8.6 oz)       Personally Reviewed Medications Prior to Admission     Prior to Admission medications    Medication Sig Start Date End Date Taking? Authorizing Provider   vitamin B-12 (CYANOCOBALAMIN) 500 MCG tablet Take 1 tablet by mouth daily   Yes Uziel Ibarra MD   vitamin D (CHOLECALCIFEROL) 25 MCG (1000 UT) TABS tablet Take 2 tablets by mouth daily   Yes Uziel Ibarra MD   memantine (NAMENDA) 10 MG tablet Take 1.5 tablets by mouth daily   Yes Uziel Ibarra MD   latanoprost (XALATAN) 0.005 % ophthalmic solution  5/19/12   Uziel Ibarra MD       Physical Exam:    Physical Exam:    General: Well developed/thin, nontoxic appearing, in no acute distress, poorly kept smelling of urine  HEENT: Normocephalic, atraumatic, anicteric sclerae, clear conjunctivae, dry mucous membranes, adequate dentition  Neck: Supple, no rigidity, no cervical supraclavicular lymphadenopathy, no thyromegaly or bruits  Lungs: Clear to auscultation bilaterally, normal respiratory effort  Heart: RRR, normal S1-S2, no murmurs  Abdomen: Soft, nontender, nondistended, normal bowel sounds, no organomegaly  : No suprapubic tenderness, no bladder distention, no Gutierres  Musculoskeletal: No gross joint deformities, normal muscle bulk and tone  Extremities: No clubbing, cyanosis, edema, palpable distal pulses bilaterally  Skin: normal color, temperature, turgor, no petechiae, rashes, ecchymoses  Neuro: AAO x 0, CN II-XII grossly intact, soft touch and muscle strength intact, no tremors or ataxia, speech confused  Psych: Unable to assess due to his dementia    Past Medical History:   PMHx No past medical history on file.  PSHX:  has a past surgical

## 2025-03-11 PROBLEM — I50.9 ACUTE CHF (CONGESTIVE HEART FAILURE) (HCC): Status: RESOLVED | Noted: 2025-03-10 | Resolved: 2025-03-11

## 2025-03-11 LAB
ANION GAP SERPL CALCULATED.3IONS-SCNC: 11 MMOL/L (ref 3–16)
BUN SERPL-MCNC: 29 MG/DL (ref 7–20)
CALCIUM SERPL-MCNC: 9.7 MG/DL (ref 8.3–10.6)
CHLORIDE SERPL-SCNC: 107 MMOL/L (ref 99–110)
CHOLEST SERPL-MCNC: 208 MG/DL (ref 0–199)
CO2 SERPL-SCNC: 27 MMOL/L (ref 21–32)
CREAT SERPL-MCNC: 1.1 MG/DL (ref 0.8–1.3)
DEPRECATED RDW RBC AUTO: 16.3 % (ref 12.4–15.4)
EKG ATRIAL RATE: 75 BPM
EKG DIAGNOSIS: NORMAL
EKG P AXIS: 34 DEGREES
EKG P-R INTERVAL: 164 MS
EKG Q-T INTERVAL: 390 MS
EKG QRS DURATION: 124 MS
EKG QTC CALCULATION (BAZETT): 435 MS
EKG R AXIS: -49 DEGREES
EKG T AXIS: -6 DEGREES
EKG VENTRICULAR RATE: 75 BPM
GFR SERPLBLD CREATININE-BSD FMLA CKD-EPI: 64 ML/MIN/{1.73_M2}
GLUCOSE SERPL-MCNC: 92 MG/DL (ref 70–99)
HCT VFR BLD AUTO: 37.8 % (ref 40.5–52.5)
HDLC SERPL-MCNC: 67 MG/DL (ref 40–60)
HGB BLD-MCNC: 12.2 G/DL (ref 13.5–17.5)
LACTATE BLDV-SCNC: 1.4 MMOL/L (ref 0.4–1.9)
LDLC SERPL CALC-MCNC: 126 MG/DL
MCH RBC QN AUTO: 27 PG (ref 26–34)
MCHC RBC AUTO-ENTMCNC: 32.4 G/DL (ref 31–36)
MCV RBC AUTO: 83.3 FL (ref 80–100)
PLATELET # BLD AUTO: 227 K/UL (ref 135–450)
PMV BLD AUTO: 8 FL (ref 5–10.5)
POTASSIUM SERPL-SCNC: 4.3 MMOL/L (ref 3.5–5.1)
RBC # BLD AUTO: 4.53 M/UL (ref 4.2–5.9)
SODIUM SERPL-SCNC: 145 MMOL/L (ref 136–145)
TRIGL SERPL-MCNC: 73 MG/DL (ref 0–150)
TROPONIN, HIGH SENSITIVITY: 26 NG/L (ref 0–22)
VLDLC SERPL CALC-MCNC: 15 MG/DL
WBC # BLD AUTO: 8.2 K/UL (ref 4–11)

## 2025-03-11 PROCEDURE — 6370000000 HC RX 637 (ALT 250 FOR IP): Performed by: INTERNAL MEDICINE

## 2025-03-11 PROCEDURE — 97530 THERAPEUTIC ACTIVITIES: CPT

## 2025-03-11 PROCEDURE — 6360000002 HC RX W HCPCS: Performed by: INTERNAL MEDICINE

## 2025-03-11 PROCEDURE — 6360000002 HC RX W HCPCS: Performed by: NURSE PRACTITIONER

## 2025-03-11 PROCEDURE — 97116 GAIT TRAINING THERAPY: CPT | Performed by: PHYSICAL THERAPIST

## 2025-03-11 PROCEDURE — 84484 ASSAY OF TROPONIN QUANT: CPT

## 2025-03-11 PROCEDURE — 83605 ASSAY OF LACTIC ACID: CPT

## 2025-03-11 PROCEDURE — 2500000003 HC RX 250 WO HCPCS: Performed by: INTERNAL MEDICINE

## 2025-03-11 PROCEDURE — 2060000000 HC ICU INTERMEDIATE R&B

## 2025-03-11 PROCEDURE — 80048 BASIC METABOLIC PNL TOTAL CA: CPT

## 2025-03-11 PROCEDURE — 97530 THERAPEUTIC ACTIVITIES: CPT | Performed by: PHYSICAL THERAPIST

## 2025-03-11 PROCEDURE — 93010 ELECTROCARDIOGRAM REPORT: CPT | Performed by: INTERNAL MEDICINE

## 2025-03-11 PROCEDURE — 92610 EVALUATE SWALLOWING FUNCTION: CPT

## 2025-03-11 PROCEDURE — 80061 LIPID PANEL: CPT

## 2025-03-11 PROCEDURE — 85027 COMPLETE CBC AUTOMATED: CPT

## 2025-03-11 PROCEDURE — 97166 OT EVAL MOD COMPLEX 45 MIN: CPT

## 2025-03-11 PROCEDURE — 2580000003 HC RX 258: Performed by: INTERNAL MEDICINE

## 2025-03-11 PROCEDURE — 83036 HEMOGLOBIN GLYCOSYLATED A1C: CPT

## 2025-03-11 PROCEDURE — 97162 PT EVAL MOD COMPLEX 30 MIN: CPT | Performed by: PHYSICAL THERAPIST

## 2025-03-11 PROCEDURE — 97535 SELF CARE MNGMENT TRAINING: CPT

## 2025-03-11 PROCEDURE — 36415 COLL VENOUS BLD VENIPUNCTURE: CPT

## 2025-03-11 RX ORDER — LORAZEPAM 2 MG/ML
1 INJECTION INTRAMUSCULAR ONCE
Status: COMPLETED | OUTPATIENT
Start: 2025-03-11 | End: 2025-03-11

## 2025-03-11 RX ADMIN — SODIUM CHLORIDE, PRESERVATIVE FREE 10 ML: 5 INJECTION INTRAVENOUS at 19:58

## 2025-03-11 RX ADMIN — ASPIRIN 81 MG: 81 TABLET, CHEWABLE ORAL at 02:25

## 2025-03-11 RX ADMIN — ATORVASTATIN CALCIUM 80 MG: 80 TABLET, FILM COATED ORAL at 02:25

## 2025-03-11 RX ADMIN — ATORVASTATIN CALCIUM 80 MG: 80 TABLET, FILM COATED ORAL at 19:58

## 2025-03-11 RX ADMIN — SODIUM CHLORIDE, POTASSIUM CHLORIDE, SODIUM LACTATE AND CALCIUM CHLORIDE: 600; 310; 30; 20 INJECTION, SOLUTION INTRAVENOUS at 02:25

## 2025-03-11 RX ADMIN — SODIUM CHLORIDE, PRESERVATIVE FREE 10 ML: 5 INJECTION INTRAVENOUS at 00:00

## 2025-03-11 RX ADMIN — OLANZAPINE 10 MG: 5 TABLET, ORALLY DISINTEGRATING ORAL at 19:58

## 2025-03-11 RX ADMIN — ASPIRIN 81 MG: 81 TABLET, CHEWABLE ORAL at 08:15

## 2025-03-11 RX ADMIN — LORAZEPAM 1 MG: 2 INJECTION INTRAMUSCULAR; INTRAVENOUS at 23:08

## 2025-03-11 RX ADMIN — ENOXAPARIN SODIUM 40 MG: 100 INJECTION SUBCUTANEOUS at 08:15

## 2025-03-11 ASSESSMENT — PAIN SCALES - PAIN ASSESSMENT IN ADVANCED DEMENTIA (PAINAD)
CONSOLABILITY: NO NEED TO CONSOLE
TOTALSCORE: 0
BREATHING: NORMAL
TOTALSCORE: 0
FACIALEXPRESSION: SMILING OR INEXPRESSIVE
BODYLANGUAGE: RELAXED
BODYLANGUAGE: RELAXED
CONSOLABILITY: NO NEED TO CONSOLE
FACIALEXPRESSION: SMILING OR INEXPRESSIVE
BREATHING: NORMAL

## 2025-03-11 NOTE — PROGRESS NOTES
V2.0    [facility] [place] Note      Name:  Junior Krishnamurthy /Age/Sex: 1934  (89 y.o. male)   MRN & CSN:  [id number] & [id number] Encounter Date/Time: 3/11/2025 12:17 PM EDT   Location:  [id number] PCP: No primary care provider on file.     Attending:Ez Gan MD       Hospital Day: 2    Assessment and Recommendations   Junior Krishnamurthy is a 89 y.o. male who presents with Slurred speech      Plan:       Dysarthria, with possible acute stroke aspirin statin MRI ordered, neurology consulted on admission  Dementia, at risk for delirium monitor closely  Hypercholesterolemia statin for now  Minimally elevated troponin due to above      Diet ADULT DIET; Regular; Kosher   DVT Prophylaxis [] Lovenox, []  Heparin, [] SCDs, [] Ambulation,  [] Eliquis, [] Xarelto  [] Coumadin   Code Status Full Code             Personally reviewed Lab Studies and Imaging     Discussed management of the case with neurology who recommended MRI    Rhythm strip independently interpreted by myself heart rate 88 QT 0 42 no ST elevation        Medical Decision Making:  The following items were considered in medical decision making:  Discussion of patient care with other providers  Reviewed clinical lab tests  Reviewed radiology tests  Reviewed other diagnostic tests/interventions  Independent review of radiologic images  Microbiology cultures and other micro tests reviewed      Subjective:     Chief Complaint: Slurred speech    Junior Krishnamurthy is a 89 y.o. male who presents with slurred speech overall improved no focal weakness numbness denies fever chills nausea or dizziness nurse at bedside      Review of Systems:      Pertinent positives and negatives discussed in HPI    Objective:     Intake/Output Summary (Last 24 hours) at 3/11/2025 1217  Last data filed at 3/11/2025 1103  Gross per 24 hour   Intake 1320 ml   Output 400 ml   Net 920 ml      Vitals:   Vitals:    25 0748 25 0752 25 0807 25 1131   BP:    Component Value Date/Time    LABA1C 5.8 03/11/2025 05:29 AM     TSH:   Lab Results   Component Value Date/Time    TSH 0.61 08/16/2017 01:05 PM     Troponin: No results found for: \"TROPONINT\"  Lactic Acid: No results for input(s): \"LACTA\" in the last 72 hours.  BNP:   Recent Labs     03/10/25  1710   PROBNP 372     UA:  Lab Results   Component Value Date/Time    NITRU POSITIVE 03/23/2021 04:50 PM    COLORU YELLOW 03/23/2021 04:50 PM    PHUR 5.5 03/23/2021 04:50 PM    WBCUA 48 03/23/2021 04:50 PM    RBCUA 2 03/23/2021 04:50 PM    BACTERIA 4+ 03/23/2021 04:50 PM    CLARITYU CLOUDY 03/23/2021 04:50 PM    LEUKOCYTESUR SMALL 03/23/2021 04:50 PM    UROBILINOGEN 0.2 03/23/2021 04:50 PM    BILIRUBINUR Negative 03/23/2021 04:50 PM    BLOODU TRACE 03/23/2021 04:50 PM    GLUCOSEU Negative 03/23/2021 04:50 PM    KETUA Negative 03/23/2021 04:50 PM     Urine Cultures:   Lab Results   Component Value Date/Time    LABURIN  03/23/2021 04:50 PM     >50,000 CFU/ml mixed skin/urogenital jacob. No further workup     Blood Cultures:   Lab Results   Component Value Date/Time    BC No Growth after 4 days of incubation. 03/23/2021 06:00 PM     Lab Results   Component Value Date/Time    BLOODCULT2 No Growth after 4 days of incubation. 03/23/2021 06:23 PM     Organism:   Lab Results   Component Value Date/Time    ORG Enterobacter cloacae 02/05/2019 03:15 PM         Electronically signed by Ez Gan MD on 3/11/2025 at 12:17 PM  Comment: Please note this report has been produced using speech recognition software and may contain errors related to that system including errors in grammar, punctuation, and spelling, as well as words and phrases that may be inappropriate. If there are any questions or concerns, please feel free to contact the dictating provider for clarification.

## 2025-03-11 NOTE — ED NOTES
ED TO INPATIENT SBAR HANDOFF    Patient Name: Junior Krishnamurthy   Preferred Name: Junior  : 1934  89 y.o.   Family/Caregiver Present: no   Code Status Order: Full Code  PO Status: NPO:No  Telemetry Order:   C-SSRS: Risk of Suicide: No Risk  Sitter  Safety  Restraints:     Sepsis Risk Score      Situation  Chief Complaint   Patient presents with    no complaints     Pt was dropped off to ED by caregiver.  Pt has no complaints but has history of dementia.       Brief Description of Patient's Condition: pt to ED without caregiver.  Initially patient was brought to triage and had no complaints.  Pt was dropped off by caregiver and told the  that the patient was shaky this am and the caregiver left.  Call received from pts family stating that patient was having slurred speech this am.   Mental Status: disoriented and alert  Arrived from:Home  Imaging:   CT HEAD WO CONTRAST   Final Result   No acute intracranial abnormality.         XR CHEST (2 VW)   Final Result   Findings suggest congestive heart failure         MRI brain without contrast    (Results Pending)     Abnormal labs:   Abnormal Labs Reviewed   CBC WITH AUTO DIFFERENTIAL - Abnormal; Notable for the following components:       Result Value    RDW 15.7 (*)     All other components within normal limits   COMPREHENSIVE METABOLIC PANEL W/ REFLEX TO MG FOR LOW K - Abnormal; Notable for the following components:    Glucose 100 (*)     BUN 29 (*)     Est, Glom Filt Rate 57 (*)     All other components within normal limits       Background  Allergies: No Known Allergies  History: No past medical history on file.    Assessment  Vitals: MEWS Score: 1  Level of Consciousness: Alert (0)   Vitals:    03/10/25 1342 03/10/25 2015   BP: (!) 140/81    Pulse: 97    Resp: 17    Temp: 98.1 °F (36.7 °C)    TempSrc: Oral    SpO2: 98%    Weight: 64.2 kg (141 lb 8.6 oz)    Height:  1.626 m (5' 4\")     Deterioration Index (DI): Deterioration Index:

## 2025-03-11 NOTE — PROGRESS NOTES
[facility]: [place] [facility] PROGRESSIVE CARE  DYSPHAGIA BEDSIDE SWALLOW EVALUATION     Patient: Junior Krishnamurthy   : 1934   MRN: [id number]      Evaluation Date: 3/11/2025     Admitting Diagnosis:   Slurred speech [R47.81]  Cognitive impairment [R41.89]   has no past medical history on file.   has a past surgical history that includes Cataract removal and Glaucoma surgery.  Allergies: NKA  Dysphagia History including instrumental assessment: none on record  Baseline/Prior Level of Function: Living Status: admitted from home alone, Per chart: Daughter lives in [place] and is in the process of getting pt moved there so she can take care of him. Right now, he lives alone and has caregiver 8a-2p Monday-Friday. [facility] Baseline diet: regular/thin    Onset Date: 3/10/2025        Reason for referral: SLP evaluation orders received due to CVA protocol                         CURRENT ENCOUNTER DIAGNOSTICS/COURSE OF ADMISSION     3/10/2025 admitted with c/o slurred speech. MD ADMISSION H&P HPI: \"Junior Krishnamurthy is a 89 y.o. male [facility] patient with severe dementia with unknown pmh who presents with slurred speech.\"   Consults noted: Neurology  Neuro note (3/11/2025) reviewed: \"The patient reportedly was experiencing some dysarthria prior to arrival.  Other than his baseline dementia his neurologic exam is non focal. Differential is rather broad here. It is reasonable to obtain brain MRI w/o to rule out stroke. Metabolic workup per hospitalist.\"      Most Recent Imaging:  3/10/2025 CXR: IMPRESSION: Findings suggest congestive heart failure  3/10/2025 CT Head: IMPRESSION: No acute intracranial abnormality.  MRI Brain: ordered 3/10/2025    Current Diet Level (prior to evaluation): Regular texture, Thin liquids      Respiratory Status: Room Air     SUBJECTIVE     General: Accepted and tolerated evaluation at bedside    Comments regarding referral/diet/dysphagia:   Patient: denies concerns for

## 2025-03-11 NOTE — CARE COORDINATION
Advance Care Planning   Healthcare Decision Maker:    Primary Decision Maker: JATIN JOHNSTON - [facility] - [phone]      Today we documented Decision Maker(s) consistent with Legal Next of Kin hierarchy.       Electronically signed by Kortney Tanner on 3/11/2025 at 2:45 PM

## 2025-03-11 NOTE — CONSULTS
Neurology Consult Note  Reason for Consult: slurred speech    Chief complaint: nothing    Dr Gan, Ez MONDRAGON MD asked me to see Junior Krishnamurthy in consultation for evaluation of slurred speech    History of Present Illness:  Junior Krishnamurthy is a 89 y.o. male who presents with slurred speech.     I obtained my information via chart review.  The patient has dementia at baseline and is unable to provide any relevant history.     From what I can gather, the patient's daughter spoke to him around 0800 yesterday and he seemed to be at his baseline.  Later he complained of some \"shaking\".  Apparently his caregiver felt that he was slurring his speech at one point which did improve after he was given an Ensure.  He was transported to the hospital shortly after 1300 in order to be evaluated.     CT head was w/out any acute hemorrhage.  BP was 140/81.  No fever.  NIH 0.     Currently he is sitting up in the chair w/out any specific complaints. He is feeding himself lunch.      Medical History:  No past medical history on file.    Past Surgical History:   Procedure Laterality Date    CATARACT REMOVAL      GLAUCOMA SURGERY       Scheduled Meds:   sodium chloride flush  5-40 mL IntraVENous 2 times per day    enoxaparin  40 mg SubCUTAneous Daily    atorvastatin  80 mg Oral Nightly    aspirin  81 mg Oral Daily    Or    aspirin  300 mg Rectal Daily    OLANZapine zydis  10 mg Oral Nightly     Medications Prior to Admission:   vitamin B-12 (CYANOCOBALAMIN) 500 MCG tablet, Take 1 tablet by mouth daily  vitamin D (CHOLECALCIFEROL) 25 MCG (1000 UT) TABS tablet, Take 2 tablets by mouth daily  memantine (NAMENDA) 10 MG tablet, Take 1.5 tablets by mouth daily  latanoprost (XALATAN) 0.005 % ophthalmic solution,     No Known Allergies    Family History   Problem Relation Age of Onset    Glaucoma Mother     Glaucoma Father      Social History     Tobacco Use   Smoking Status Never   Smokeless Tobacco Never     Social History  30    WBC 8.7  Hg 14.0  Platelets 278    Flu negative  COVID negative    Studies  CT head w/o 3/10/25, independently reviewed  IMPRESSION:  No acute intracranial abnormality.    Impression/Recommendations  Dysarthria.   Dementia.   Hyperlipidemia.     The patient reportedly was experiencing some dysarthria prior to arrival.  Other than his baseline dementia his neurologic exam is non focal.      Differential is rather broad here.     It is reasonable to obtain brain MRI w/o to rule out stroke.     Metabolic workup per hospitalist.      Eliceo Guy NP  [facility]    A copy of this note was provided for Ez Brower MD

## 2025-03-11 NOTE — PROGRESS NOTES
4 Eyes Skin Assessment     NAME:  Junior Krishnamurthy  YOB: 1934  MEDICAL RECORD NUMBER:  [id number]    The patient is being assessed for  Admission    I agree that at least one RN has performed a thorough Head to Toe Skin Assessment on the patient. ALL assessment sites listed below have been assessed.      Areas assessed by both nurses:    Head, Face, Ears, Shoulders, Back, Chest, Arms, Elbows, Hands, Sacrum. Buttock, Coccyx, Ischium, Legs. Feet and Heels, and Under Medical Devices No open areas , dry skin ,no redness to buttock , heels blanachable w/o any redness.        Does the Patient have a Wound? No noted wound(s)       Larry Prevention initiated by RN: Yes  Wound Care Orders initiated by RN: No    Pressure Injury (Stage 3,4, Unstageable, DTI, NWPT, and Complex wounds) if present, place Wound referral order by RN under : No    New Ostomies, if present place, Ostomy referral order under : No     Nurse 1 eSignature: Electronically signed by Betsy Whiteside RN on 3/11/25 at 6:24 AM EDT    **SHARE this note so that the co-signing nurse can place an eSignature**    Nurse 2 eSignature: Electronically signed by Scar Sullivan RN on 3/11/25 at 9:33 AM EDT

## 2025-03-11 NOTE — PROGRESS NOTES
NAME:  Junior Krishnamurthy  YOB: 1934  MEDICAL RECORD NUMBER:  [id number]  TODAYS DATE:  3/11/2025    Discussed personal risk factors for Stroke/TIA with patient/family, and ways to reduce the risk for a recurrent stroke. Patient's personal risk factors which were identified are:     [] Alcohol Abuse: check with your physician before any alcohol consumption.   [] Atrial fibrillation: may cause blood clots.  [] Drug Abuse: Seek help, talk with your doctor  [] Clotting Disorder  [] Diabetes  [] Family history of stroke or heart disease  [] High Blood Pressure/Hypertension: work with your physician.  [] High cholesterol: monitor cholesterol levels with your physician.   [] Overweight/Obesity: work with your physician for your ideal body weight.  [x] Physical Inactivity: get regular exercise as directed by your physician.   [] Personal history of previous TIA or stroke  [] Poor Diet; decrease salt (sodium) in your diet, follow diet directed by physician.   [] Smoking: Cigarette/Cigar: stop smoking.         Advised pt. that you can reduce your risk for stroke/TIA by modifying/controlling the risk factors that you have. Pt.advised to take the medications as prescribed, which will be detailed in the discharge instructions, and to not stop taking them without consulting their physician. In addition, pt. advised to maintain a healthy diet, exercise regularly and to not smoke.      [facility]'s Stroke treatment and prevention, Managing your recovery  notebook  provided and/or reviewed  with patient/family.  The notebook includes, but not limited to, sections addressing warning signs & symptoms of a stroke, which are: sudden numbness or weakness especially on one side of the body, sudden confusion, difficulty speaking or understanding, sudden changes in vision, sudden dizziness or loss of balance/ coordination, sudden severe headache, syncope and seizure.  The need to call EMS (911) immediately if signs &  symptoms occur is emphasized . The notebook also provides education on Stroke community resources and stroke advocacy.    The need for follow-up after discharge was highlighted with patient/family with them being able to repeat understanding of the importance of this.      Electronically signed by MARY FISHER RN on 3/11/2025 at 10:47 AM

## 2025-03-11 NOTE — PROGRESS NOTES
03/11/25 1639   Encounter Summary   Encounter Overview/Reason Spiritual/Emotional Needs   Service Provided For Patient   Referral/Consult From Nurse   Last Encounter  03/11/25  ( visited and prayed with pt)   Complexity of Encounter Low   Begin Time 1512   End Time  1530   Total Time Calculated 18 min   Spiritual/Emotional needs   Type Spiritual Support   Assessment/Intervention/Outcome   Assessment Calm;Coping   Intervention Active listening;Prayer (assurance of)/[place]   Outcome Comfort;Expressed Gratitude

## 2025-03-11 NOTE — PROGRESS NOTES
Occupational Therapy  Facility/Department: [facility] 5 PROGRESSIVE CARE  Occupational Therapy Initial Assessment    Name: Junior Krishnamurthy  : 1934  MRN: [id number]  Date of Service: 3/11/2025    Discharge Recommendations:  Continue to assess pending progress (24 hour assist vs SNF)  OT Equipment Recommendations  Equipment Needed: No     Junior Krishnamurthy scored a 17/24 on the AM-[facility] ADL Inpatient form.   At this time, pt not safe to return home alone d/t cognition/dementia and dec balance and safety. Recommend skilled placement or home with 24 hour assist.      Patient Diagnosis(es): The primary encounter diagnosis was Cognitive impairment. A diagnosis of Slurred speech was also pertinent to this visit.  Past Medical History:  has no past medical history on file.  Past Surgical History:  has a past surgical history that includes Cataract removal and Glaucoma surgery.           Assessment  Performance deficits / Impairments: Decreased functional mobility ;Decreased safe awareness;Decreased balance;Decreased ADL status;Decreased cognition;Decreased high-level IADLs;Decreased endurance;Decreased strength  Assessment: 89 y/o male admitted 3/10/2025 with slurred speech. Pt with hx of dementia. Per chart, pt lives alone, has caregiver 8-2 Mon-Friday. Pt reports requires intermittent assist with ADLs and functional mobility with 4WW. Today, pt oriented to self only and frequently asking where he is and why he was here- reoriented multiple times throughout session. Pt required CGA for transfers and functional mobility in/out of bathroom. Pt completed grooming tasks seated in front of sink. Anticipate will require at least min A for ADLs d/t cognition and overall balance. At this time, pt not safe to return home alone d/t cognition/dementia and dec balance. Recommend skilled placement or home with 24 hour assist.  Prognosis: Fair  Decision Making: Medium Complexity  REQUIRES OT FOLLOW-UP: Yes  Activity Tolerance  Activity  inc effort and tends to brace LEs against chair    Vision  Vision: Within Functional Limits  Hearing  Hearing: Exceptions to WFL  Hearing Exceptions: Hard of hearing/hearing concerns    Cognition  Overall Cognitive Status: Exceptions  Arousal/Alertness: Appears intact  Following Commands: Follows one step commands consistently  Attention Span: Attends with cues to redirect  Memory: Impaired  Safety Judgement: Impaired  Problem Solving: Impaired  Insights: Impaired  Initiation: Requires cues for some  Orientation  Overall Orientation Status: Impaired  Orientation Level: Oriented to person;Disoriented to place;Disoriented to time;Disoriented to situation                     LUE AROM (degrees)  LUE AROM : WFL  Left Hand AROM (degrees)  Left Hand AROM: WFL  RUE AROM (degrees)  RUE AROM : WFL  Right Hand AROM (degrees)  Right Hand AROM: WFL       AM-PAC - ADL  AM-PAC Daily Activity - Inpatient   How much help is needed for putting on and taking off regular lower body clothing?: A Little  How much help is needed for bathing (which includes washing, rinsing, drying)?: A Little  How much help is needed for toileting (which includes using toilet, bedpan, or urinal)?: A Lot  How much help is needed for putting on and taking off regular upper body clothing?: A Little  How much help is needed for taking care of personal grooming?: A Little  How much help for eating meals?: A Little  AM-PAC Inpatient Daily Activity Raw Score: 17  AM-PAC Inpatient ADL T-Scale Score : 37.26  ADL Inpatient CMS 0-100% Score: 50.11  ADL Inpatient CMS G-Code Modifier : CK    Goals  Short Term Goals  Time Frame for Short Term Goals: Prior to DC  Short Term Goal 1: Pt will complete ADL transfer/mobility with supervision  Short Term Goal 2: Pt will tolerate standing > 5 min for functional task with SBA  Short Term Goal 3: Pt will complete toileting with SBA  Short Term Goal 4: Pt will complete LB Dressing with SBA  Patient Goals   Patient goals : no goal

## 2025-03-11 NOTE — CARE COORDINATION
Case Management Assessment  Initial Evaluation    Date/Time of Evaluation: 3/11/2025 2:43 PM  Assessment Completed by: Kortney Tanner    If patient is discharged prior to next notation, then this note serves as note for discharge by case management.    Patient Name: Junior Krishnamurthy                   YOB: 1934  Diagnosis: Slurred speech [R47.81]  Cognitive impairment [R41.89]                   Date / Time: 3/10/2025  2:15 PM    Patient Admission Status: Inpatient   Readmission Risk (Low < 19, Mod (19-27), High > 27): Readmission Risk Score: 12.1    Current PCP: No primary care provider on file.  PCP verified by CM? No (No PCP)    Chart Reviewed: Yes      History Provided by: Patient  Patient Orientation: Alert and Oriented    Patient Cognition: Dementia / Early Alzheimer's    Hospitalization in the last 30 days (Readmission):  No    If yes, Readmission Assessment in CM Navigator will be completed.    Advance Directives:      Code Status: Full Code   Patient's Primary Decision Maker is: Legal Next of Kin    Primary Decision Maker: JATIN KRISHNAMURTHY [facility] - [phone]    Discharge Planning:    Patient lives with: Alone Type of Home: Apartment  Primary Care Giver: Self  Patient Support Systems include: Family Members, Meals on Wheels, Emergency Call System   Current Financial resources: Medicare  Current community resources: Other (Comment) (COA)  Current services prior to admission: Emergency Call  System, Meals On Wheels            Current DME:              Type of Home Care services:  OT, PT    ADLS  Prior functional level: Independent in ADLs/IADLs  Current functional level: Independent in ADLs/IADLs    PT AM-PAC: 17 /24  OT AM-PAC: 17 /24    Family can provide assistance at DC: No  Would you like Case Management to discuss the discharge plan with any other family members/significant others, and if so, who? No  Plans to Return to Present Housing: Unknown at present  Other Identified Issues/Barriers to

## 2025-03-11 NOTE — CARE COORDINATION
Discharge Planning:  Called Daughter - Ramona back and she has not emailed this SW her  POA paperwork, Left message.  Daughter Ramona called back and reported that she has spoken with [facility]'s  with [facility]- [facility] [phone], and she is suppose to forward  POA paperwork to [facility].    Discussed with daughter that therapy is reporting patient's need for 24/7 care.  Daughter is in the process of making arrangements for patient to live with her in [place], she reports that she needs 2 more weeks.  Patient may be eligible for [facility] SNF until she is able to finish plans for her to move in with her.  Emailed [facility] SNF list and regular SNF list.  Daughters email is: [email or web address]

## 2025-03-11 NOTE — PROGRESS NOTES
Patient arrived to the unit at approximately 2355. NIH stroke scale score done with ED nurse for hand off. Patient has dementia at baseline and alert to self and place at times, no slurred speech . Patient denies having any pain or discomfort upon admission. Bed alarm initiated and call light in patient's reach. Patient has attempted several times to get up w/o staff assisted, order for virtual  placed , as of right none there are no virtual companions available.

## 2025-03-11 NOTE — PLAN OF CARE
Problem: Chronic Conditions and Co-morbidities  Goal: Patient's chronic conditions and co-morbidity symptoms are monitored and maintained or improved  Outcome: Progressing     Problem: Discharge Planning  Goal: Discharge to home or other facility with appropriate resources  Outcome: Progressing     Problem: Safety - Adult  Goal: Free from fall injury  Outcome: Progressing     Problem: Neurosensory - Adult  Goal: Achieves stable or improved neurological status  Outcome: Progressing  Goal: Absence of seizures  Outcome: Progressing  Goal: Remains free of injury related to seizures activity  Outcome: Progressing     Problem: Cardiovascular - Adult  Goal: Maintains optimal cardiac output and hemodynamic stability  Outcome: Progressing  Goal: Absence of cardiac dysrhythmias or at baseline  Outcome: Progressing     Problem: Musculoskeletal - Adult  Goal: Return mobility to safest level of function  Outcome: Progressing  Goal: Return ADL status to a safe level of function  Outcome: Progressing     Problem: Genitourinary - Adult  Goal: Absence of urinary retention  Outcome: Progressing  Goal: Urinary catheter remains patent  Outcome: Progressing

## 2025-03-11 NOTE — PROGRESS NOTES
Patient frequently getting up and setting off chair alarm. Order already placed for virtual . RN called telesitter to see if any available cameras. Advised none available and waitlist of 4 people.    Electronically signed by MARY FISHER RN on 3/11/2025 at 4:02 PM    Telecam now available and in patient room.    Electronically signed by MARY FISHER RN on 3/11/2025 at 6:40 PM

## 2025-03-11 NOTE — ED PROVIDER NOTES
EMERGENCY DEPARTMENT SUPERVISING PHYSICIAN NOTE    I have seen this patient & have reviewed history and findings with the PA, NP, or resident physician and provided direct supervision. I saw the patient and performed a substantive portion of the visit. I was present for key portions of any procedures performed. I've participated in medical management, monitoring, and treatment of this patient with the provider. I have reviewed currently available documentation, test results, and laboratory results in the interim. Care plan has been developed collaboratively. I take responsibility for the patient's management from when I was asked to get involved in this patient's care. Dayna and FUNMI are the primary clinicians of record.    Brief HPI:  89M brought here from home  The reports that daughter spoke with Mr. Krishnamurthy over the phone multiple times today  At one point she felt that his speech was abnormal  This reportedly normalized after he was given something to eat and drink  Inconsistent history regarding whether or not he had some shaking or tremulousness earlier in the day  Nephew with patient at bedside currently feels that Mr. Krishnamurthy's dementia has essentially just progressed to the point that he is no longer safe to continue living at home independently  Mr. Krishnamurthy states that he has had a little bit of a runny nose as of late  States that he is otherwise currently feeling \"great\"  His contribution to H&P details is limited by chronic neurocognitive deficits    Pertinent Exam Findings:  Awake, alert, not acutely ill-appearing, not distressed  CTAB, RRR, RR and WOB normal  Abdomen soft, NT, ND  Confused, disoriented  No facial asymmetry, PERRLA, EOMI  RUE - 5/5 strength  RLE - 5/5 strength  LUE - 5/5 strength  LLE - 5/5 strength  Sensation to light touch intact all extremities  Ambulatory independently    Plan:  Seem to have significant neurocognitive impairment  Agree that he likely needs SNF placement  While in the  ED developed significantly worsening confusion, disorientation, agitation, aggression  Gave dose of droperidol 0.625 mg with modest improvement and then dose of lorazepam 1 mg with significant improvement  Appears to be resting comfortably in exam bed now    Final Impression(s)  1. Cognitive impairment    2. Slurred speech           Alphonso Spain MD  03/10/25 [id number]

## 2025-03-11 NOTE — PROGRESS NOTES
Physical Therapy  Facility/Department: [address] PROGRESSIVE CARE  Physical Therapy Initial Assessment    Name: Junior Krishnamurthy  : 1934  MRN: [id number]  Date of Service: 3/11/2025    Discharge Recommendations:  Subacute/Skilled Nursing Facility (SNF vs home with 24/7 assist and home PT)   PT Equipment Recommendations  Equipment Needed: No      Junior Krishnamurthy scored a 17/24 on the AM-PAC short mobility form. Current research shows that an AM-PAC score of 17 or less is typically not associated with a discharge to the patient's home setting. Based on the patient's AM-PAC score and their current functional mobility deficits, it is recommended that the patient have 3-5 sessions per week of Physical Therapy at d/c to increase the patient's independence.  Please see assessment section for further patient specific details.    If patient discharges prior to next session this note will serve as a discharge summary.  Please see below for the latest assessment towards goals.      Patient Diagnosis(es): The primary encounter diagnosis was Cognitive impairment. A diagnosis of Slurred speech was also pertinent to this visit.  Past Medical History:  has no past medical history on file.  Past Surgical History:  has a past surgical history that includes Cataract removal and Glaucoma surgery.    Assessment  Assessment: pt is a 89 yo male who was adm to [facility] with slurred speech; pt at baseline lives alone and has a HHA 5/xwk from 8-2.  Pt currently needing assist for mobility tasks due to decreased cognition and balance.  Pt is an elevated fall risk and does not appear to be safe to return home alone.  Pt would benefit from continued therapy either in SNF or home with 24/7 assist.  Therapy Prognosis: Fair;Good  Decision Making: Medium Complexity  Barriers to Learning: cognition  Requires PT Follow-Up: Yes  Activity Tolerance  Activity Tolerance: Patient tolerated evaluation without incident;Patient tolerated treatment  pt- unsure accuracy.  Per chart: Daughter lives in [place] and is in the process of getting pt moved there so she can take care of him. Right now, he lives alone and has caregiver 8a-2p Monday-Friday. [facility]  Vision/Hearing  Vision  Vision: Within Functional Limits  Hearing  Hearing: Exceptions to WFL  Hearing Exceptions: Hard of hearing/hearing concerns    Cognition   Orientation  Overall Orientation Status: Impaired  Orientation Level: Oriented to person;Disoriented to place;Disoriented to time;Disoriented to situation  Cognition  Overall Cognitive Status: Exceptions  Arousal/Alertness: Appears intact  Following Commands: Follows one step commands consistently  Attention Span: Attends with cues to redirect  Memory: Impaired  Safety Judgement: Impaired  Problem Solving: Impaired  Insights: Impaired  Initiation: Requires cues for some    Objective    AROM RLE (degrees)  RLE AROM: WFL  AROM LLE (degrees)  LLE AROM : WFL  Strength RLE  Strength RLE: WFL  Strength LLE  Strength LLE: WFL           Bed mobility  Supine to Sit: Stand by assistance  Bed Mobility Comments: up in chair at end of session  Transfers  Sit to Stand: Contact guard assistance  Stand to Sit: Contact guard assistance  Ambulation  Surface: Level tile  Device: Rollator  Assistance: Contact guard assistance  Gait Deviations: Slow Grazyna;Decreased step length  Distance: 20' and 25'x2  Comments: Pt sat at sink to complete grooming tasks; assisted with shaving with electric rebecca and then pt wanted a razor which nursing reported was ok therefore obtained for pt     Balance  Comments: SBA for sitting EOB; CGA/min A for standing with rollator      Pt set up in chair with LEs elevated and pillows/waffle cushion in place for support and pressure relief; all needs in reach; pt set up for breakfast tray    AM-PAC - Mobility    AM-PAC Basic Mobility - Inpatient   How much help is needed turning from your back to your side while in a

## 2025-03-12 LAB
BACTERIA URNS QL MICRO: ABNORMAL /HPF
BILIRUB UR QL STRIP.AUTO: NEGATIVE
CLARITY UR: ABNORMAL
COLOR UR: YELLOW
EPI CELLS #/AREA URNS AUTO: 0 /HPF (ref 0–5)
EST. AVERAGE GLUCOSE BLD GHB EST-MCNC: 119.8 MG/DL
GLUCOSE UR STRIP.AUTO-MCNC: NEGATIVE MG/DL
HBA1C MFR BLD: 5.8 %
HGB UR QL STRIP.AUTO: NEGATIVE
HYALINE CASTS #/AREA URNS AUTO: 0 /LPF (ref 0–8)
KETONES UR STRIP.AUTO-MCNC: NEGATIVE MG/DL
LEUKOCYTE ESTERASE UR QL STRIP.AUTO: ABNORMAL
NITRITE UR QL STRIP.AUTO: NEGATIVE
PH UR STRIP.AUTO: 8 [PH] (ref 5–8)
PROT UR STRIP.AUTO-MCNC: NEGATIVE MG/DL
RBC CLUMPS #/AREA URNS AUTO: 0 /HPF (ref 0–4)
SP GR UR STRIP.AUTO: 1.01 (ref 1–1.03)
UA COMPLETE W REFLEX CULTURE PNL UR: YES
UA DIPSTICK W REFLEX MICRO PNL UR: YES
URN SPEC COLLECT METH UR: ABNORMAL
UROBILINOGEN UR STRIP-ACNC: 0.2 E.U./DL
WBC #/AREA URNS AUTO: 135 /HPF (ref 0–5)

## 2025-03-12 PROCEDURE — 97535 SELF CARE MNGMENT TRAINING: CPT

## 2025-03-12 PROCEDURE — 2500000003 HC RX 250 WO HCPCS: Performed by: INTERNAL MEDICINE

## 2025-03-12 PROCEDURE — 97116 GAIT TRAINING THERAPY: CPT | Performed by: PHYSICAL THERAPIST

## 2025-03-12 PROCEDURE — 87086 URINE CULTURE/COLONY COUNT: CPT

## 2025-03-12 PROCEDURE — 6370000000 HC RX 637 (ALT 250 FOR IP): Performed by: INTERNAL MEDICINE

## 2025-03-12 PROCEDURE — 6360000002 HC RX W HCPCS: Performed by: INTERNAL MEDICINE

## 2025-03-12 PROCEDURE — 97530 THERAPEUTIC ACTIVITIES: CPT | Performed by: PHYSICAL THERAPIST

## 2025-03-12 PROCEDURE — 2580000003 HC RX 258: Performed by: NURSE PRACTITIONER

## 2025-03-12 PROCEDURE — 94760 N-INVAS EAR/PLS OXIMETRY 1: CPT

## 2025-03-12 PROCEDURE — 2060000000 HC ICU INTERMEDIATE R&B

## 2025-03-12 PROCEDURE — 81001 URINALYSIS AUTO W/SCOPE: CPT

## 2025-03-12 PROCEDURE — 97530 THERAPEUTIC ACTIVITIES: CPT

## 2025-03-12 RX ORDER — OLANZAPINE 10 MG/2ML
2.5 INJECTION, POWDER, FOR SOLUTION INTRAMUSCULAR
Status: DISCONTINUED | OUTPATIENT
Start: 2025-03-12 | End: 2025-03-13

## 2025-03-12 RX ORDER — LATANOPROST 50 UG/ML
1 SOLUTION/ DROPS OPHTHALMIC DAILY
Status: DISCONTINUED | OUTPATIENT
Start: 2025-03-12 | End: 2025-03-12

## 2025-03-12 RX ORDER — 0.9 % SODIUM CHLORIDE 0.9 %
500 INTRAVENOUS SOLUTION INTRAVENOUS ONCE
Status: COMPLETED | OUTPATIENT
Start: 2025-03-12 | End: 2025-03-12

## 2025-03-12 RX ORDER — VITAMIN B COMPLEX
2000 TABLET ORAL DAILY
Status: DISCONTINUED | OUTPATIENT
Start: 2025-03-12 | End: 2025-03-18 | Stop reason: HOSPADM

## 2025-03-12 RX ORDER — MULTIVITAMIN WITH IRON
500 TABLET ORAL DAILY
Status: DISCONTINUED | OUTPATIENT
Start: 2025-03-12 | End: 2025-03-18 | Stop reason: HOSPADM

## 2025-03-12 RX ORDER — LATANOPROST 50 UG/ML
1 SOLUTION/ DROPS OPHTHALMIC NIGHTLY
Status: DISCONTINUED | OUTPATIENT
Start: 2025-03-12 | End: 2025-03-18 | Stop reason: HOSPADM

## 2025-03-12 RX ORDER — QUETIAPINE FUMARATE 25 MG/1
25 TABLET, FILM COATED ORAL ONCE
Status: DISCONTINUED | OUTPATIENT
Start: 2025-03-12 | End: 2025-03-12

## 2025-03-12 RX ADMIN — SODIUM CHLORIDE 500 ML: 9 INJECTION, SOLUTION INTRAVENOUS at 03:55

## 2025-03-12 RX ADMIN — ATORVASTATIN CALCIUM 80 MG: 80 TABLET, FILM COATED ORAL at 20:54

## 2025-03-12 RX ADMIN — MEMANTINE 15 MG: 10 TABLET ORAL at 15:12

## 2025-03-12 RX ADMIN — ASPIRIN 81 MG: 81 TABLET, CHEWABLE ORAL at 08:59

## 2025-03-12 RX ADMIN — SODIUM CHLORIDE, PRESERVATIVE FREE 10 ML: 5 INJECTION INTRAVENOUS at 20:53

## 2025-03-12 RX ADMIN — Medication 2000 UNITS: at 15:15

## 2025-03-12 RX ADMIN — CYANOCOBALAMIN TAB 500 MCG 500 MCG: 500 TAB at 15:15

## 2025-03-12 RX ADMIN — OLANZAPINE 10 MG: 5 TABLET, ORALLY DISINTEGRATING ORAL at 20:54

## 2025-03-12 RX ADMIN — ENOXAPARIN SODIUM 40 MG: 100 INJECTION SUBCUTANEOUS at 08:55

## 2025-03-12 RX ADMIN — LATANOPROST 1 DROP: 50 SOLUTION OPHTHALMIC at 20:57

## 2025-03-12 RX ADMIN — SODIUM CHLORIDE, PRESERVATIVE FREE 10 ML: 5 INJECTION INTRAVENOUS at 08:56

## 2025-03-12 RX ADMIN — WATER 1000 MG: 1 INJECTION INTRAMUSCULAR; INTRAVENOUS; SUBCUTANEOUS at 11:45

## 2025-03-12 NOTE — CARE COORDINATION
DISCHARGE PLANNING:     Patient currently in restraints. Will need SNF placement, [facility] and his  is Deneen [phone]. Health Care Power of  [facility] from [facility] and placed paper chart.   Electronically signed by SAM Mcdonald on 3/12/2025 at 4:31 PM

## 2025-03-12 NOTE — PROGRESS NOTES
This RN attempted to call patients emergency contacts. The number for patients daughter would not go through after multiple attempts. This RN also called the patients siblings and was unanswered and no voicemail available to leave message.

## 2025-03-12 NOTE — PROGRESS NOTES
V2.0    [facility] Progress Note      Name:  Junior Krishnamurthy /Age/Sex: 1934  (89 y.o. male)   MRN & CSN:  [id number] & [id number] Encounter Date/Time: 3/12/2025 11:30 AM EDT   Location:  [id number] PCP: No primary care provider on file.     Attending:Ez Gan MD       Hospital Day: 3    Assessment and Recommendations   Junior Krishnamurthy is a 89 y.o. male who presents with Slurred speech      Plan:   Dysarthria, with possible acute stroke aspirin statin MRI ordered, neurology consulted on admission MRI still pending.  Dementia, with possible delirium at this time, received dose of Zyprexa yesterday received Ativan some agitation this morning, adding as needed Zyprexa IM, discussed with nursing  UTI, present on admission IV antibiotics  Hypercholesterolemia statin for now  Minimally elevated troponin due to above          Diet ADULT DIET; Regular; Kosher   DVT Prophylaxis Lovenox, heparin, SCDs, ambulation, Eliquis, Xarelto  Coumadin   Code Status Full Code             Personally reviewed Lab Studies and Imaging     Discussed management of the case with case management who recommended SNF    Rhythm strip independently interpreted by myself heart rate 115 QT 0.33 no ST elevation    Medical Decision Making:  The following items were considered in medical decision making:  Discussion of patient care with other providers  Reviewed clinical lab tests  Reviewed radiology tests  Reviewed other diagnostic tests/interventions  Independent review of radiologic images  Microbiology cultures and other micro tests reviewed      Subjective:     Chief Complaint: Dysarthria    Junior Krishnamurthy is a 89 y.o. male who presents with dysarthria episodes of agitation and confusion      Review of Systems:      Pertinent positives and negatives discussed in HPI    Objective:     Intake/Output Summary (Last 24 hours) at 3/12/2025 1130  Last data filed at 3/11/2025 2132  Gross per 24 hour   Intake 912.24 ml   Output 900 ml    Net 12.24 ml      Vitals:   Vitals:    03/11/25 2308 03/12/25 0300 03/12/25 0415 03/12/25 0853   BP: 105/69  121/62 134/76   Pulse: 83   (!) 111   Resp: 15   17   Temp: 98.4 °F (36.9 °C)  98.2 °F (36.8 °C)    TempSrc: Oral  Oral    SpO2: 97%  99% 96%   Weight:  63 kg (138 lb 14.2 oz)     Height:             Physical Exam:      Physical Exam Performed:    /76   Pulse (!) 111   Temp 98.2 °F (36.8 °C) (Oral)   Resp 17   Ht 1.626 m (5' 4\")   Wt 63 kg (138 lb 14.2 oz)   SpO2 96%   BMI 23.84 kg/m²     General appearance: No apparent distress  Neck: Supple.  Respiratory:  Normal respiratory effort. Clear to auscultation, bilaterally without Rales/Wheezes/Rhonchi.  Cardiovascular: Regular rate and rhythm with normal S1/S2 without murmurs, rubs or gallops.  Abdomen: Soft, non-tender  Musculoskeletal: No clubbing, cyanosis  Neurologic: Generalized weakness  Psychiatric: Alert and oriented  Capillary Refill: Brisk, 3 seconds, normal   Peripheral Pulses: +2 palpable, equal bilaterally       Medications:   Medications:    QUEtiapine  25 mg Oral Once    cefTRIAXone (ROCEPHIN) IV  1,000 mg IntraVENous Q24H    sodium chloride flush  5-40 mL IntraVENous 2 times per day    enoxaparin  40 mg SubCUTAneous Daily    atorvastatin  80 mg Oral Nightly    aspirin  81 mg Oral Daily    Or    aspirin  300 mg Rectal Daily    OLANZapine zydis  10 mg Oral Nightly      Infusions:    sodium chloride       PRN Meds: sodium chloride flush, 5-40 mL, PRN  sodium chloride, , PRN  ondansetron, 4 mg, Q8H PRN   Or  ondansetron, 4 mg, Q6H PRN  polyethylene glycol, 17 g, Daily PRN  acetaminophen, 650 mg, Q4H PRN   Or  acetaminophen, 650 mg, Q4H PRN  labetalol, 10 mg, Q10 Min PRN  LORazepam, 4 mg, Q6H PRN        Labs and Imaging   CT HEAD WO CONTRAST  Result Date: 3/10/2025  EXAMINATION: CT OF THE HEAD WITHOUT CONTRAST  3/10/2025 3:56 pm TECHNIQUE: CT of the head was performed without the administration of intravenous contrast. Automated

## 2025-03-12 NOTE — PLAN OF CARE
Problem: Chronic Conditions and Co-morbidities  Goal: Patient's chronic conditions and co-morbidity symptoms are monitored and maintained or improved  3/11/2025 2357 by Tiffanie Jimenez RN  Outcome: Progressing  Flowsheets (Taken 3/11/2025 1955)  Care Plan - Patient's Chronic Conditions and Co-Morbidity Symptoms are Monitored and Maintained or Improved: Monitor and assess patient's chronic conditions and comorbid symptoms for stability, deterioration, or improvement     Problem: Discharge Planning  Goal: Discharge to home or other facility with appropriate resources  3/11/2025 2357 by Tiffanie Jimenez RN  Outcome: Progressing  Flowsheets (Taken 3/11/2025 1955)  Discharge to home or other facility with appropriate resources: Identify barriers to discharge with patient and caregiver     Problem: Safety - Adult  Goal: Free from fall injury  3/11/2025 2357 by Tiffanie Jimenez RN  Outcome: Progressing     Problem: Neurosensory - Adult  Goal: Achieves stable or improved neurological status  3/11/2025 2357 by Tiffanie Jimenez RN  Outcome: Progressing  Flowsheets (Taken 3/11/2025 1955)  Achieves stable or improved neurological status: Assess for and report changes in neurological status     Problem: Neurosensory - Adult  Goal: Absence of seizures  3/11/2025 2357 by Tiffanie Jimenez RN  Outcome: Progressing  Flowsheets (Taken 3/11/2025 1955)  Absence of seizures: Monitor for seizure activity.  If seizure occurs, document type and location of movements and any associated apnea     Problem: Neurosensory - Adult  Goal: Remains free of injury related to seizures activity  3/11/2025 2357 by Tiffanie Jimenez RN  Outcome: Progressing  Flowsheets (Taken 3/11/2025 1955)  Remains free of injury related to seizure activity: Maintain airway, patient safety  and administer oxygen as ordered     Problem: Cardiovascular - Adult  Goal: Maintains optimal cardiac output and hemodynamic stability  3/11/2025 2357 by Tiffanie Jimenez RN  Outcome:  Progressing  Flowsheets (Taken 3/11/2025 1955)  Maintains optimal cardiac output and hemodynamic stability: Monitor blood pressure and heart rate     Problem: Cardiovascular - Adult  Goal: Absence of cardiac dysrhythmias or at baseline  3/11/2025 2357 by Tiffanie Jimenez RN  Outcome: Progressing  Flowsheets (Taken 3/11/2025 1955)  Absence of cardiac dysrhythmias or at baseline: Monitor cardiac rate and rhythm     Problem: Musculoskeletal - Adult  Goal: Return mobility to safest level of function  3/11/2025 2357 by Tiffanie Jimenez RN  Outcome: Progressing  Flowsheets (Taken 3/11/2025 1955)  Return Mobility to Safest Level of Function: Assess patient stability and activity tolerance for standing, transferring and ambulating with or without assistive devices     Problem: Musculoskeletal - Adult  Goal: Return ADL status to a safe level of function  3/11/2025 2357 by Tiffanie Jimenez RN  Outcome: Progressing  Flowsheets (Taken 3/11/2025 1955)  Return ADL Status to a Safe Level of Function: Administer medication as ordered     Problem: Genitourinary - Adult  Goal: Absence of urinary retention  3/11/2025 2357 by Tiffanie Jimenez RN  Outcome: Progressing  Flowsheets (Taken 3/11/2025 1955)  Absence of urinary retention: Assess patient’s ability to void and empty bladder     Problem: Genitourinary - Adult  Goal: Urinary catheter remains patent  3/11/2025 2357 by Tiffanie Jimenez RN  Outcome: Progressing  Flowsheets (Taken 3/11/2025 1955)  Urinary catheter remains patent: Assess patency of urinary catheter

## 2025-03-12 NOTE — PROGRESS NOTES
Patients daughter Ramona is updated with shift events (restraints and reason). Patients daughter verbally expressed understanding.

## 2025-03-12 NOTE — PROGRESS NOTES
Occupational Therapy  Facility/Department: [facility] 5W PROGRESSIVE CARE  Occupational Therapy Daily Assessment    Name: Junior Krishnamurthy  : 1934  MRN: [id number]  Date of Service: 3/12/2025    Discharge Recommendations:  Patient would benefit from continued therapy after discharge, 3-5 sessions per week  OT Equipment Recommendations  Equipment Needed: No     Junior Krishnamurthy scored a 12/24 on the AM-PAC ADL Inpatient form. Current research shows that an AM-PAC score of 17 or less is typically not associated with a discharge to the patient's home setting. Based on the patient's AM-PAC score and their current ADL deficits, it is recommended that the patient have 3-5 sessions per week of Occupational Therapy at d/c to increase the patient's independence.  Please see assessment section for further patient specific details.    If patient discharges prior to next session this note will serve as a discharge summary.  Please see below for the latest assessment towards goals.      Patient Diagnosis(es): The primary encounter diagnosis was Cognitive impairment. A diagnosis of Slurred speech was also pertinent to this visit.  Past Medical History:  has no past medical history on file.  Past Surgical History:  has a past surgical history that includes Cataract removal and Glaucoma surgery.           Assessment  Performance deficits / Impairments: Decreased functional mobility ;Decreased safe awareness;Decreased balance;Decreased ADL status;Decreased cognition;Decreased high-level IADLs;Decreased endurance;Decreased strength  Assessment: 89 y/o male admitted 3/10/2025 with slurred speech. Pt with hx of dementia. Per chart, pt lives alone, has caregiver 8-2 Mon-Friday. Pt reports requires intermittent assist with ADLs and functional mobility with 4WW. Today, pt pleasantly confused. Pt required mod A for bed mobility and min-mod A for sitting balance EOB 2/2 posterior lean. Pt with soiled t shirt and button down shirt- required

## 2025-03-12 NOTE — PROGRESS NOTES
Physical Therapy  Facility/Department: [address] PROGRESSIVE CARE  Physical Therapy Treatment Note  Name: Junior Krishnamurthy  : 1934  MRN: [id number]  Date of Service: 3/12/2025    Discharge Recommendations:  Subacute/Skilled Nursing Facility   PT Equipment Recommendations  Equipment Needed: No      Junior Krishnamurthy scored a  on the AM-PAC short mobility form. Current research shows that an AM-PAC score of 17 or less is typically not associated with a discharge to the patient's home setting. Based on the patient's AM-PAC score and their current functional mobility deficits, it is recommended that the patient have 3-5 sessions per week of Physical Therapy at d/c to increase the patient's independence.  Please see assessment section for further patient specific details.    If patient discharges prior to next session this note will serve as a discharge summary.  Please see below for the latest assessment towards goals.      Patient Diagnosis(es): The primary encounter diagnosis was Cognitive impairment. A diagnosis of Slurred speech was also pertinent to this visit.  Past Medical History:  has no past medical history on file.  Past Surgical History:  has a past surgical history that includes Cataract removal and Glaucoma surgery.    Assessment  Body Structures, Functions, Activity Limitations Requiring Skilled Therapeutic Intervention: Decreased functional mobility ;Decreased cognition;Decreased endurance  Assessment: pt is a 89 yo male who was adm to hosp with slurred speech; pt at baseline lives alone and has a HHA 5/xwk from 8-2.  Pt currently very confused and needing significant assist for mobility tasks due to decreased cognition and balance deficits.  Pt is a high fall risk and is not  safe to return home alone.  Pt would benefit from continued therapy either in SNF to address deficits to allow pt to regain his max potential  Therapy Prognosis: Fair;Good  Decision Making: Medium Complexity  Barriers to  Learning: cognition  Requires PT Follow-Up: Yes  Activity Tolerance  Activity Tolerance: Treatment limited secondary to decreased cognition    Plan  Physical Therapy Plan  General Plan: 3-5 times per week  Current Treatment Recommendations: Functional mobility training, Transfer training, Balance training, Strengthening, Cognitive/Perceptual training, Endurance training, Gait training, Therapeutic activities  Safety Devices  Type of Devices: Nurse notified, Gait belt, Patient at risk for falls, Bed alarm in place, Call light within reach, Telesitter in use, Left in bed  Restraints  Restraints Initially in Place: Yes (wrist restraints in place)  Restraints: leonel soft wrist retraints    Restrictions  Restrictions/Precautions  Restrictions/Precautions: Fall Risk  Position Activity Restriction  Other Position/Activity Restrictions: male pure wick; hx dementia     Subjective  General  Chart Reviewed: Yes  Patient assessed for rehabilitation services?: Yes  Additional Pertinent Hx: per H&P note: \"Junior Krishnamurthy is a 89 y.o. male [facility] patient, severely demented with unknown pmh  who presents with Slurred speech.\"  Response To Previous Treatment: Patient with no complaints from previous session.  Family/Caregiver Present: No  Referring Practitioner: Sudha España MD  Referral Date : 03/10/25  Other (Comment): very distracted  Subjective  Subjective: pt pleasant but more confused this date         Social/Functional History  Social/Functional History  Lives With: Alone  Type of Home: Apartment (2nd level)  Home Layout: One level  Home Access: Level entry, Elevator  Bathroom Shower/Tub: Walk-in shower  Bathroom Equipment: Shower chair, Grab bars in shower  Home Equipment: Walker - 4-Wheeled  Prior Level of Assist for ADLs:  (caregiver assists with showers; pt can toilet and dress self)  Prior Level of Assist for Homemaking:  (light cooking)  Prior Level of Assist for Ambulation: Independent household ambulator, with or
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
## 2025-03-13 ENCOUNTER — APPOINTMENT (OUTPATIENT)
Dept: MRI IMAGING | Age: 89
DRG: 092 | End: 2025-03-13
Payer: MEDICARE

## 2025-03-13 LAB
ALBUMIN SERPL-MCNC: 3.5 G/DL (ref 3.4–5)
ALBUMIN/GLOB SERPL: 1.1 {RATIO} (ref 1.1–2.2)
ALP SERPL-CCNC: 74 U/L (ref 40–129)
ALT SERPL-CCNC: 20 U/L (ref 10–40)
ANION GAP SERPL CALCULATED.3IONS-SCNC: 11 MMOL/L (ref 3–16)
AST SERPL-CCNC: 38 U/L (ref 15–37)
BACTERIA UR CULT: NORMAL
BASOPHILS # BLD: 0 K/UL (ref 0–0.2)
BASOPHILS NFR BLD: 0.6 %
BILIRUB SERPL-MCNC: 0.9 MG/DL (ref 0–1)
BUN SERPL-MCNC: 26 MG/DL (ref 7–20)
CALCIUM SERPL-MCNC: 9.5 MG/DL (ref 8.3–10.6)
CHLORIDE SERPL-SCNC: 107 MMOL/L (ref 99–110)
CO2 SERPL-SCNC: 24 MMOL/L (ref 21–32)
CREAT SERPL-MCNC: 1.2 MG/DL (ref 0.8–1.3)
DEPRECATED RDW RBC AUTO: 15.5 % (ref 12.4–15.4)
EOSINOPHIL # BLD: 0.2 K/UL (ref 0–0.6)
EOSINOPHIL NFR BLD: 2.4 %
GFR SERPLBLD CREATININE-BSD FMLA CKD-EPI: 57 ML/MIN/{1.73_M2}
GLUCOSE SERPL-MCNC: 112 MG/DL (ref 70–99)
HCT VFR BLD AUTO: 38.9 % (ref 40.5–52.5)
HGB BLD-MCNC: 12.8 G/DL (ref 13.5–17.5)
LYMPHOCYTES # BLD: 1.4 K/UL (ref 1–5.1)
LYMPHOCYTES NFR BLD: 17 %
MCH RBC QN AUTO: 27 PG (ref 26–34)
MCHC RBC AUTO-ENTMCNC: 32.8 G/DL (ref 31–36)
MCV RBC AUTO: 82.4 FL (ref 80–100)
MONOCYTES # BLD: 0.7 K/UL (ref 0–1.3)
MONOCYTES NFR BLD: 8.3 %
NEUTROPHILS # BLD: 5.8 K/UL (ref 1.7–7.7)
NEUTROPHILS NFR BLD: 71.7 %
PLATELET # BLD AUTO: 259 K/UL (ref 135–450)
PMV BLD AUTO: 7.5 FL (ref 5–10.5)
POTASSIUM SERPL-SCNC: 4.2 MMOL/L (ref 3.5–5.1)
PROT SERPL-MCNC: 6.7 G/DL (ref 6.4–8.2)
RBC # BLD AUTO: 4.73 M/UL (ref 4.2–5.9)
SODIUM SERPL-SCNC: 142 MMOL/L (ref 136–145)
WBC # BLD AUTO: 8.1 K/UL (ref 4–11)

## 2025-03-13 PROCEDURE — 36415 COLL VENOUS BLD VENIPUNCTURE: CPT

## 2025-03-13 PROCEDURE — 70551 MRI BRAIN STEM W/O DYE: CPT

## 2025-03-13 PROCEDURE — 2500000003 HC RX 250 WO HCPCS: Performed by: INTERNAL MEDICINE

## 2025-03-13 PROCEDURE — 6360000002 HC RX W HCPCS: Performed by: INTERNAL MEDICINE

## 2025-03-13 PROCEDURE — 6370000000 HC RX 637 (ALT 250 FOR IP): Performed by: INTERNAL MEDICINE

## 2025-03-13 PROCEDURE — 80053 COMPREHEN METABOLIC PANEL: CPT

## 2025-03-13 PROCEDURE — 2060000000 HC ICU INTERMEDIATE R&B

## 2025-03-13 PROCEDURE — 94760 N-INVAS EAR/PLS OXIMETRY 1: CPT

## 2025-03-13 PROCEDURE — 85025 COMPLETE CBC W/AUTO DIFF WBC: CPT

## 2025-03-13 PROCEDURE — 9990000010 HC NO CHARGE VISIT: Performed by: PHYSICAL THERAPIST

## 2025-03-13 RX ADMIN — Medication 2000 UNITS: at 09:01

## 2025-03-13 RX ADMIN — MEMANTINE 15 MG: 10 TABLET ORAL at 09:01

## 2025-03-13 RX ADMIN — WATER 1000 MG: 1 INJECTION INTRAMUSCULAR; INTRAVENOUS; SUBCUTANEOUS at 11:52

## 2025-03-13 RX ADMIN — ASPIRIN 81 MG: 81 TABLET, CHEWABLE ORAL at 09:01

## 2025-03-13 RX ADMIN — CYANOCOBALAMIN TAB 500 MCG 500 MCG: 500 TAB at 09:01

## 2025-03-13 RX ADMIN — SODIUM CHLORIDE, PRESERVATIVE FREE 10 ML: 5 INJECTION INTRAVENOUS at 09:02

## 2025-03-13 RX ADMIN — ENOXAPARIN SODIUM 40 MG: 100 INJECTION SUBCUTANEOUS at 09:01

## 2025-03-13 NOTE — PROGRESS NOTES
Patient confused and agitated. Refusing food and drink from staff. Patient also refusing meds at this time. Patient pulled out IV from left forearm. Tele camera notified staff. Patient is in bed, family is in room.     Electronically signed by Gamaliel Cox RN on 3/13/2025 at 7:50 PM

## 2025-03-13 NOTE — PROGRESS NOTES
Occupational Therapy      Attempted to see pt for OT tx. Pt off floor for MRI at time of attempt. Will follow up as schedule and pt condition permit.    Electronically signed by Bethanie Schmitt OT on 3/13/2025 at 1:35 PM

## 2025-03-13 NOTE — PLAN OF CARE
Problem: Chronic Conditions and Co-morbidities  Goal: Patient's chronic conditions and co-morbidity symptoms are monitored and maintained or improved  Outcome: Progressing  Flowsheets (Taken 3/12/2025 2053)  Care Plan - Patient's Chronic Conditions and Co-Morbidity Symptoms are Monitored and Maintained or Improved: Monitor and assess patient's chronic conditions and comorbid symptoms for stability, deterioration, or improvement     Problem: Discharge Planning  Goal: Discharge to home or other facility with appropriate resources  Outcome: Progressing  Flowsheets (Taken 3/12/2025 2053)  Discharge to home or other facility with appropriate resources: Identify barriers to discharge with patient and caregiver     Problem: Safety - Adult  Goal: Free from fall injury  Outcome: Progressing     Problem: Neurosensory - Adult  Goal: Achieves stable or improved neurological status  Outcome: Progressing  Flowsheets (Taken 3/12/2025 2053)  Achieves stable or improved neurological status: Assess for and report changes in neurological status  Goal: Absence of seizures  Outcome: Progressing  Flowsheets (Taken 3/12/2025 2053)  Absence of seizures: Monitor for seizure activity.  If seizure occurs, document type and location of movements and any associated apnea  Goal: Remains free of injury related to seizures activity  Outcome: Progressing  Flowsheets (Taken 3/12/2025 2053)  Remains free of injury related to seizure activity: Maintain airway, patient safety  and administer oxygen as ordered     Problem: Cardiovascular - Adult  Goal: Maintains optimal cardiac output and hemodynamic stability  Outcome: Progressing  Flowsheets (Taken 3/12/2025 2053)  Maintains optimal cardiac output and hemodynamic stability: Monitor blood pressure and heart rate  Goal: Absence of cardiac dysrhythmias or at baseline  Outcome: Progressing  Flowsheets (Taken 3/12/2025 2053)  Absence of cardiac dysrhythmias or at baseline: Monitor cardiac rate and  rhythm     Problem: Musculoskeletal - Adult  Goal: Return mobility to safest level of function  Outcome: Progressing  Flowsheets (Taken 3/12/2025 2053)  Return Mobility to Safest Level of Function: Assess patient stability and activity tolerance for standing, transferring and ambulating with or without assistive devices  Goal: Return ADL status to a safe level of function  Outcome: Progressing  Flowsheets (Taken 3/12/2025 2053)  Return ADL Status to a Safe Level of Function: Administer medication as ordered     Problem: Genitourinary - Adult  Goal: Absence of urinary retention  Outcome: Progressing  Flowsheets (Taken 3/12/2025 2053)  Absence of urinary retention: Assess patient’s ability to void and empty bladder  Goal: Urinary catheter remains patent  Outcome: Progressing  Flowsheets (Taken 3/12/2025 2053)  Urinary catheter remains patent: Assess patency of urinary catheter     Problem: Safety - Medical Restraint  Goal: Remains free of injury from restraints (Restraint for Interference with Medical Device)  Description: INTERVENTIONS:  1. Determine that other, less restrictive measures have been tried or would not be effective before applying the restraint  2. Evaluate the patient's condition at the time of restraint application  3. Inform patient/family regarding the reason for restraint  4. Q2H: Monitor safety, psychosocial status, comfort, nutrition and hydration  Outcome: Progressing

## 2025-03-13 NOTE — PROGRESS NOTES
Patient is agitated this evening. He is repeatedly trying to get out of bed. He states \"he needs to get to his daughter\". Patient is confused and thinks his daughter is a small child getting off the bus. Patient easily redirected.

## 2025-03-13 NOTE — PROGRESS NOTES
V2.0    [facility] Progress Note      Name:  Junior Krishnamurthy /Age/Sex: 1934  (89 y.o. male)   MRN & CSN:  [id number] & [id number] Encounter Date/Time: 3/13/2025 12:16 PM EDT   Location:  [id number] PCP: No primary care provider on file.     Attending:Ez Gan MD       Hospital Day: 4    Assessment and Recommendations   Junior Krishnamurthy is a 89 y.o. male who presents with Slurred speech      Plan:   Dysarthria, with possible acute stroke aspirin statin MRI ordered, neurology consulted on admission MRI planned this afternoon  Dementia, with possible delirium at this time, fluctuating with some improvement, nurse at bedside   UTI, present on admission IV antibiotics  Hypercholesterolemia statin for now  Minimally elevated troponin due to above        Diet ADULT DIET; Regular; Kosher   DVT Prophylaxis [] Lovenox, []  Heparin, [] SCDs, [] Ambulation,  [] Eliquis, [] Xarelto  [] Coumadin   Code Status Full Code             Personally reviewed Lab Studies and Imaging     Discussed management of the case with case management over IDR who recommended PT OT    Rhythm strip independently interpreted by myself heart rate 71 QT 0 41 no ST elevation        Medical Decision Making:  The following items were considered in medical decision making:  Discussion of patient care with other providers  Reviewed clinical lab tests  Reviewed radiology tests  Reviewed other diagnostic tests/interventions  Independent review of radiologic images  Microbiology cultures and other micro tests reviewed      Subjective:     Chief Complaint: Weakness slurred speech    Junior Krishnamurthy is a 89 y.o. male who presents with weakness slurred speech with episode of confusion and agitation overall slowly improving      Review of Systems:      Pertinent positives and negatives discussed in HPI    Objective:     Intake/Output Summary (Last 24 hours) at 3/13/2025 1216  Last data filed at 3/13/2025 0601  Gross per 24 hour   Intake 540 ml  note this report has been produced using speech recognition software and may contain errors related to that system including errors in grammar, punctuation, and spelling, as well as words and phrases that may be inappropriate. If there are any questions or concerns, please feel free to contact the dictating provider for clarification.

## 2025-03-13 NOTE — CARE COORDINATION
Discharge Planning:    This RN CM placed call to patient's CM with the [facility], Patricia ([phone]), who confirmed that patient does not have SNF benefits under the [facility], so will need to use his Medicare benefits for skilled care.    Call placed to patient's daughter, Ramona, to check on interest in referral preferences. Patient's daughter requested that referrals in [place] be placed to:    Daughter of Stella ([phone]); left message requesting return call.  [facility] ([phone]); patient's daughter only provided phone number and contact name (Lora) for this referral. When I called, Lora confirmed that this was an ARU. Patient not appropriate for ARU at this time.     CM to continue to follow for updates.    Electronically signed by MARCO A AMADOR RN on 3/13/2025 at 4:17 PM

## 2025-03-13 NOTE — PROGRESS NOTES
Physical Therapy      Junior Krishnamurthy  [id number]  [id number]    Attempted to see for PT session however pt out of room for MRI; will continue to follow  Electronically signed by RERE ARMAS PT on 3/13/2025 at 1:37 PM

## 2025-03-13 NOTE — PROGRESS NOTES
Neurology    Chart reviewed.   MRI brain still pending.   Please call back if any significant findings.     Eliceo Curtis [facility]

## 2025-03-13 NOTE — PROGRESS NOTES
SLP NOTE    1:26 PM:  Follow-up attempted. Patient unavailable out of room at MRI. ST to re-attempt as schedule permits unless otherwise notified.    3:55 PM:  Follow-up re-attempted. Patient agitated and confused; unable to be directed at this time. ST to re-attempt as schedule permits at a later date unless otherwise notified.    Thank you.  Daniela Ramírez MA, CCC-SLP, #[id number]  Speech-Language Pathologist  Portable phone: [phone]

## 2025-03-14 LAB
ALBUMIN SERPL-MCNC: 3.6 G/DL (ref 3.4–5)
ALBUMIN/GLOB SERPL: 1.2 {RATIO} (ref 1.1–2.2)
ALP SERPL-CCNC: 72 U/L (ref 40–129)
ALT SERPL-CCNC: 22 U/L (ref 10–40)
ANION GAP SERPL CALCULATED.3IONS-SCNC: 12 MMOL/L (ref 3–16)
AST SERPL-CCNC: 41 U/L (ref 15–37)
BASOPHILS # BLD: 0.1 K/UL (ref 0–0.2)
BASOPHILS NFR BLD: 0.6 %
BILIRUB SERPL-MCNC: 0.6 MG/DL (ref 0–1)
BUN SERPL-MCNC: 32 MG/DL (ref 7–20)
CALCIUM SERPL-MCNC: 9.1 MG/DL (ref 8.3–10.6)
CHLORIDE SERPL-SCNC: 107 MMOL/L (ref 99–110)
CO2 SERPL-SCNC: 24 MMOL/L (ref 21–32)
CREAT SERPL-MCNC: 1.4 MG/DL (ref 0.8–1.3)
DEPRECATED RDW RBC AUTO: 15.7 % (ref 12.4–15.4)
EOSINOPHIL # BLD: 0.1 K/UL (ref 0–0.6)
EOSINOPHIL NFR BLD: 0.8 %
GFR SERPLBLD CREATININE-BSD FMLA CKD-EPI: 48 ML/MIN/{1.73_M2}
GLUCOSE BLD-MCNC: 118 MG/DL (ref 70–99)
GLUCOSE BLD-MCNC: 133 MG/DL (ref 70–99)
GLUCOSE SERPL-MCNC: 110 MG/DL (ref 70–99)
HCT VFR BLD AUTO: 38 % (ref 40.5–52.5)
HGB BLD-MCNC: 12.6 G/DL (ref 13.5–17.5)
LYMPHOCYTES # BLD: 1.4 K/UL (ref 1–5.1)
LYMPHOCYTES NFR BLD: 14.1 %
MCH RBC QN AUTO: 27.3 PG (ref 26–34)
MCHC RBC AUTO-ENTMCNC: 33.1 G/DL (ref 31–36)
MCV RBC AUTO: 82.3 FL (ref 80–100)
MONOCYTES # BLD: 0.9 K/UL (ref 0–1.3)
MONOCYTES NFR BLD: 9.7 %
NEUTROPHILS # BLD: 7.3 K/UL (ref 1.7–7.7)
NEUTROPHILS NFR BLD: 74.8 %
PERFORMED ON: ABNORMAL
PERFORMED ON: ABNORMAL
PLATELET # BLD AUTO: 241 K/UL (ref 135–450)
PMV BLD AUTO: 8 FL (ref 5–10.5)
POTASSIUM SERPL-SCNC: 4 MMOL/L (ref 3.5–5.1)
PROT SERPL-MCNC: 6.7 G/DL (ref 6.4–8.2)
RBC # BLD AUTO: 4.62 M/UL (ref 4.2–5.9)
SODIUM SERPL-SCNC: 143 MMOL/L (ref 136–145)
WBC # BLD AUTO: 9.8 K/UL (ref 4–11)

## 2025-03-14 PROCEDURE — 80053 COMPREHEN METABOLIC PANEL: CPT

## 2025-03-14 PROCEDURE — 85025 COMPLETE CBC W/AUTO DIFF WBC: CPT

## 2025-03-14 PROCEDURE — 97530 THERAPEUTIC ACTIVITIES: CPT | Performed by: PHYSICAL THERAPIST

## 2025-03-14 PROCEDURE — 6370000000 HC RX 637 (ALT 250 FOR IP): Performed by: INTERNAL MEDICINE

## 2025-03-14 PROCEDURE — 97535 SELF CARE MNGMENT TRAINING: CPT

## 2025-03-14 PROCEDURE — 36415 COLL VENOUS BLD VENIPUNCTURE: CPT

## 2025-03-14 PROCEDURE — 6360000002 HC RX W HCPCS: Performed by: INTERNAL MEDICINE

## 2025-03-14 PROCEDURE — 2060000000 HC ICU INTERMEDIATE R&B

## 2025-03-14 PROCEDURE — 2500000003 HC RX 250 WO HCPCS: Performed by: INTERNAL MEDICINE

## 2025-03-14 PROCEDURE — 97116 GAIT TRAINING THERAPY: CPT | Performed by: PHYSICAL THERAPIST

## 2025-03-14 PROCEDURE — 94760 N-INVAS EAR/PLS OXIMETRY 1: CPT

## 2025-03-14 RX ORDER — ENOXAPARIN SODIUM 100 MG/ML
30 INJECTION SUBCUTANEOUS DAILY
Status: DISCONTINUED | OUTPATIENT
Start: 2025-03-15 | End: 2025-03-18 | Stop reason: HOSPADM

## 2025-03-14 RX ADMIN — ASPIRIN 81 MG: 81 TABLET, CHEWABLE ORAL at 08:17

## 2025-03-14 RX ADMIN — LATANOPROST 1 DROP: 50 SOLUTION OPHTHALMIC at 21:11

## 2025-03-14 RX ADMIN — WATER 1000 MG: 1 INJECTION INTRAMUSCULAR; INTRAVENOUS; SUBCUTANEOUS at 12:08

## 2025-03-14 RX ADMIN — MEMANTINE 15 MG: 10 TABLET ORAL at 08:17

## 2025-03-14 RX ADMIN — Medication 2000 UNITS: at 08:17

## 2025-03-14 RX ADMIN — SODIUM CHLORIDE, PRESERVATIVE FREE 10 ML: 5 INJECTION INTRAVENOUS at 08:18

## 2025-03-14 RX ADMIN — ENOXAPARIN SODIUM 40 MG: 100 INJECTION SUBCUTANEOUS at 08:18

## 2025-03-14 RX ADMIN — CYANOCOBALAMIN TAB 500 MCG 500 MCG: 500 TAB at 08:17

## 2025-03-14 RX ADMIN — ATORVASTATIN CALCIUM 80 MG: 80 TABLET, FILM COATED ORAL at 21:11

## 2025-03-14 NOTE — PROGRESS NOTES
03/23/2021 HISTORY: ORDERING SYSTEM PROVIDED HISTORY: shakiness today, hx dementia TECHNOLOGIST PROVIDED HISTORY: Has a \"code stroke\" or \"stroke alert\" been called?->No Reason for exam:->shakiness today, hx dementia Decision Support Exception - unselect if not a suspected or confirmed emergency medical condition->Emergency Medical Condition (MA) Reason for Exam: shakiness today, hx dementia FINDINGS: BRAIN/VENTRICLES: There is no acute intracranial hemorrhage, mass effect or midline shift.  No abnormal extra-axial fluid collection.  The gray-white differentiation is maintained without evidence of an acute infarct.  There is no evidence of hydrocephalus. Chronic atrophic and ischemic microvascular changes are similar to the prior exam.  Left temporal encephalomalacia is stable. ORBITS: The visualized portion of the orbits demonstrate no acute abnormality. SINUSES: There is chronic opacification of the maxillary sinuses. SOFT TISSUES/SKULL:  No acute abnormality of the visualized skull or soft tissues.     No acute intracranial abnormality.     XR CHEST (2 VW)  Result Date: 3/10/2025  EXAMINATION: TWO XRAY VIEWS OF THE CHEST 3/10/2025 3:58 pm COMPARISON: 03/23/2021 HISTORY: ORDERING SYSTEM PROVIDED HISTORY: long TECHNOLOGIST PROVIDED HISTORY: Reason for exam:->shakiness Reason for Exam: shakiness FINDINGS: Mild cardiomegaly.  Pulmonary vascular congestion.  Pulmonary edema.  No pneumothorax.  No pleural effusion.     Findings suggest congestive heart failure       CBC:   Recent Labs     03/13/25  0747 03/14/25  0501   WBC 8.1 9.8   HGB 12.8* 12.6*    241     BMP:    Recent Labs     03/13/25  0747 03/14/25  0501    143   K 4.2 4.0    107   CO2 24 24   BUN 26* 32*   CREATININE 1.2 1.4*   GLUCOSE 112* 110*     Hepatic:   Recent Labs     03/13/25  0747 03/14/25  0501   AST 38* 41*   ALT 20 22   BILITOT 0.9 0.6   ALKPHOS 74 72     Lipids:   Lab Results   Component Value Date/Time    CHOL 208  03/11/2025 05:29 AM    HDL 67 03/11/2025 05:29 AM    TRIG 73 03/11/2025 05:29 AM     Hemoglobin A1C:   Lab Results   Component Value Date/Time    LABA1C 5.8 03/11/2025 05:29 AM     TSH:   Lab Results   Component Value Date/Time    TSH 0.61 08/16/2017 01:05 PM     Troponin: No results found for: \"TROPONINT\"  Lactic Acid: No results for input(s): \"LACTA\" in the last 72 hours.  BNP: No results for input(s): \"PROBNP\" in the last 72 hours.  UA:  Lab Results   Component Value Date/Time    NITRU Negative 03/12/2025 11:03 AM    COLORU Yellow 03/12/2025 11:03 AM    PHUR 8.0 03/12/2025 11:03 AM    PHUR 5.5 03/23/2021 04:50 PM    WBCUA 135 03/12/2025 11:03 AM    RBCUA 0 03/12/2025 11:03 AM    BACTERIA 4+ 03/12/2025 11:03 AM    CLARITYU CLOUDY 03/12/2025 11:03 AM    LEUKOCYTESUR LARGE 03/12/2025 11:03 AM    UROBILINOGEN 0.2 03/12/2025 11:03 AM    BILIRUBINUR Negative 03/12/2025 11:03 AM    BLOODU Negative 03/12/2025 11:03 AM    GLUCOSEU Negative 03/12/2025 11:03 AM    KETUA Negative 03/12/2025 11:03 AM     Urine Cultures:   Lab Results   Component Value Date/Time    LABURIN  03/12/2025 11:03 AM     >50,000 CFU/ml mixed skin/urogenital jacob. No further workup     Blood Cultures:   Lab Results   Component Value Date/Time    BC No Growth after 4 days of incubation. 03/23/2021 06:00 PM     Lab Results   Component Value Date/Time    BLOODCULT2 No Growth after 4 days of incubation. 03/23/2021 06:23 PM     Organism:   Lab Results   Component Value Date/Time    ORG Enterobacter cloacae 02/05/2019 03:15 PM         Electronically signed by Ez Gan MD on 3/14/2025 at 12:52 PM  Comment: Please note this report has been produced using speech recognition software and may contain errors related to that system including errors in grammar, punctuation, and spelling, as well as words and phrases that may be inappropriate. If there are any questions or concerns, please feel free to contact the dictating provider for clarification.

## 2025-03-14 NOTE — PLAN OF CARE
Problem: Chronic Conditions and Co-morbidities  Goal: Patient's chronic conditions and co-morbidity symptoms are monitored and maintained or improved  Outcome: Progressing     Problem: Discharge Planning  Goal: Discharge to home or other facility with appropriate resources  Outcome: Progressing     Problem: Safety - Adult  Goal: Free from fall injury  Outcome: Progressing     Problem: Neurosensory - Adult  Goal: Achieves stable or improved neurological status  Outcome: Progressing  Goal: Absence of seizures  Outcome: Progressing  Goal: Remains free of injury related to seizures activity  Outcome: Progressing     Problem: Cardiovascular - Adult  Goal: Maintains optimal cardiac output and hemodynamic stability  Outcome: Progressing  Goal: Absence of cardiac dysrhythmias or at baseline  Outcome: Progressing     Problem: Musculoskeletal - Adult  Goal: Return mobility to safest level of function  Outcome: Progressing  Goal: Return ADL status to a safe level of function  Outcome: Progressing     Problem: Genitourinary - Adult  Goal: Absence of urinary retention  Outcome: Progressing  Goal: Urinary catheter remains patent  Outcome: Progressing     Problem: Skin/Tissue Integrity  Goal: Skin integrity remains intact  Description: 1.  Monitor for areas of redness and/or skin breakdown  2.  Assess vascular access sites hourly  3.  Every 4-6 hours minimum:  Change oxygen saturation probe site  4.  Every 4-6 hours:  If on nasal continuous positive airway pressure, respiratory therapy assess nares and determine need for appliance change or resting period  Outcome: Progressing

## 2025-03-14 NOTE — PROGRESS NOTES
Occupational Therapy  Facility/Department: [facility] 5W PROGRESSIVE CARE  Occupational Therapy Daily Assessment    Name: Junior Krishnamurthy  : 1934  MRN: [id number]  Date of Service: 3/14/2025    Discharge Recommendations:  Patient would benefit from continued therapy after discharge, 3-5 sessions per week  OT Equipment Recommendations  Equipment Needed: No     Junior Krishnamurthy scored a 12/24 on the AM-PAC ADL Inpatient form. Current research shows that an AM-PAC score of 17 or less is typically not associated with a discharge to the patient's home setting. Based on the patient's AM-PAC score and their current ADL deficits, it is recommended that the patient have 3-5 sessions per week of Occupational Therapy at d/c to increase the patient's independence.  Please see assessment section for further patient specific details.    If patient discharges prior to next session this note will serve as a discharge summary.  Please see below for the latest assessment towards goals.      Patient Diagnosis(es): The primary encounter diagnosis was Cognitive impairment. A diagnosis of Slurred speech was also pertinent to this visit.  Past Medical History:  has no past medical history on file.  Past Surgical History:  has a past surgical history that includes Cataract removal and Glaucoma surgery.    Assessment  Performance deficits / Impairments: Decreased functional mobility ;Decreased safe awareness;Decreased balance;Decreased ADL status;Decreased cognition;Decreased high-level IADLs;Decreased endurance;Decreased strength  Assessment: 89 y/o male admitted 3/10/2025 with slurred speech. Pt with hx of dementia. Per chart, pt lives alone, has caregiver 8-2 Mon-Friday. Pt reports requires intermittent assist with ADLs and functional mobility with 4WW. Today, pt pleasantly confused. Pt required mod A for bed mobility and CGA/min A for sitting balance. Pt required min/mod A for transfers- initial posterior lean. Pt initially able to  level)  Home Layout: One level  Home Access: Level entry, Elevator  Bathroom Shower/Tub: Walk-in shower  Bathroom Equipment: Shower chair, Grab bars in shower  Home Equipment: Walker - 4-Wheeled  Prior Level of Assist for ADLs:  (caregiver assists with showers; pt can toilet and dress self)  Prior Level of Assist for Homemaking:  (light cooking)  Prior Level of Assist for Ambulation: Independent household ambulator, with or without device, Independent community ambulator, with or without device (with 4WW)  Prior Level of Assist for Transfers: Independent  Active : Yes (does not have car)  Mode of Transportation: Walk, Bus  IADL Comments: sleeps in regular/flat  Additional Comments: Social information above provided by pt- unsure accuracy.  Per chart: Daughter lives in [place] and is in the process of getting pt moved there so she can take care of him. Right now, he lives alone and has caregiver 8a-2p Monday-Friday. [facility]  through [facility].    Objective  Safety Devices  Type of Devices: Nurse notified;Gait belt;Patient at risk for falls;Call light within reach;Telesitter in use;Chair alarm in place;Left in chair  Restraints  Restraints Initially in Place: No     Toilet Transfers  Equipment Used: Standard toilet  Toilet Transfer: Moderate assistance;Minimal assistance;2 Person assistance  Toilet Transfers Comments: stood twice from toilet > stedy lift with min/mod A x 2     ADL  Feeding: Setup  Feeding Skilled Clinical Factors: pt able to feed self breakfast after tray set up  Grooming: Stand by assistance  Grooming Skilled Clinical Factors: seated in chair to brush teeth; intermittent cuing to sequence task  LE Dressing: Maximum assistance  LE Dressing Skilled Clinical Factors: assist to manuel clean adult brief and pull over hips while sitting on toilet  Putting On/Taking Off Footwear: Maximum assistance  Putting On/Taking Off Footwear Skilled Clinical Factors: to manuel socks  Toileting: Maximum

## 2025-03-14 NOTE — PROGRESS NOTES
Physical Therapy  Facility/Department: [address] PROGRESSIVE CARE  Physical Therapy Treatment Note    Name: Junior Krishnamurthy  : 1934  MRN: [id number]  Date of Service: 3/14/2025    Discharge Recommendations:  Subacute/Skilled Nursing Facility   PT Equipment Recommendations  Equipment Needed: No      Junior Krishnamurthy scored a  on the AM-PAC short mobility form. Current research shows that an AM-PAC score of 17 or less is typically not associated with a discharge to the patient's home setting. Based on the patient's AM-PAC score and their current functional mobility deficits, it is recommended that the patient have 3-5 sessions per week of Physical Therapy at d/c to increase the patient's independence.  Please see assessment section for further patient specific details.    If patient discharges prior to next session this note will serve as a discharge summary.  Please see below for the latest assessment towards goals.      Patient Diagnosis(es): The primary encounter diagnosis was Cognitive impairment. A diagnosis of Slurred speech was also pertinent to this visit.  Past Medical History:  has no past medical history on file.  Past Surgical History:  has a past surgical history that includes Cataract removal and Glaucoma surgery.    Assessment  Body Structures, Functions, Activity Limitations Requiring Skilled Therapeutic Intervention: Decreased functional mobility ;Decreased cognition;Decreased endurance  Assessment: pt is a 89 yo male who was adm to hosp with slurred speech; pt at baseline lives alone and has a HHA 5/xwk from 8-2.  Pt currently confused but able to participate in therapy; he is needing significant assist for mobility tasks due to decreased cognition and balance deficits.  Pt is a high fall risk and is not  safe to return home alone.  Pt would benefit from continued therapy either in SNF to address deficits to allow pt to regain his max potential  Therapy Prognosis: Fair  Decision Making:  Deviations: Slow [place];Decreased step length  Distance: 10'  Comments: pt used commode and assisted with changing his brief and cleansing pt     Balance  Comments: CGA/min A for sitting EOB; standing balance initially mod A but varied between min to max A      Pt set up in chair with LEs elevated and pillows/waffle cushion in place for support and pressure relief; all needs in reach       AM-PAC - Mobility    AM-PAC Basic Mobility - Inpatient   How much help is needed turning from your back to your side while in a flat bed without using bedrails?: A Lot  How much help is needed moving from lying on your back to sitting on the side of a flat bed without using bedrails?: A Lot  How much help is needed moving to and from a bed to a chair?: A Lot  How much help is needed standing up from a chair using your arms?: A Lot  How much help is needed walking in hospital room?: A Lot  How much help is needed climbing 3-5 steps with a railing?: Total  AM-PAC Inpatient Mobility Raw Score : 11  AM-PAC Inpatient T-Scale Score : 33.86  Mobility Inpatient CMS 0-100% Score: 72.57  Mobility Inpatient CMS G-Code Modifier : CL       Goals  Short Term Goals  Time Frame for Short Term Goals: by discharge  Short Term Goal 1: bed mob MI  Short Term Goal 2: transfers MI  Short Term Goal 3: amb 100' with Rollator SBA  Patient Goals   Patient Goals : to go home       Education  Patient Education  Education Given To: Patient  Education Provided Comments: reviewed call light and not getting up without assist  Education Method: Verbal;Demonstration  Education Outcome: Continued education needed;Unable to demonstrate understanding      Therapy Time   Individual Concurrent Group Co-treatment   Time In 0921         Time Out 0950         Minutes 29         Timed Code Treatment Minutes: 29 Minutes       RERE ARMAS PT   Electronically signed by RERE ARMAS PT on 3/14/2025 at 10:18 AM

## 2025-03-14 NOTE — PROGRESS NOTES
SLP NOTE    Dysphagia follow-up attempted with lunch. Patient with lunch tray with chocolate ice cream spread on plate; patient reports \"threw it up\" and \"I'm finished\"; RN notified. RN reports no difficulty with breakfast tolerance this date.    Chart reviewed with note for baseline cognitive impairments and Brain MRI resulted as negative for acute intracranial abnormality. ST to re-attempt dysphagia follow-up at a later date to confirm no skilled ST dysphagia needs per initial POC unless otherwise notified.    Thank you.  Daniela Ramírez MA, CCC-SLP, #[id number]  Speech-Language Pathologist  Portable phone: [phone]

## 2025-03-14 NOTE — PROGRESS NOTES
Clinical Pharmacy Note  Subcutaneous Anticoagulant Adjustment     Enoxaparin has been adjusted to 30 mg daily based on [facility] policy.    Recent Labs     03/13/25  0747 03/14/25  0501   CREATININE 1.2 1.4*     Recent Labs     03/14/25  0501   HGB 12.6*   HCT 38.0*        Estimated Creatinine Clearance: 29 mL/min (A) (based on SCr of 1.4 mg/dL (H)).    Weight 63.5 kg    Pharmacist Review of Appropriate Use and Automatic Dose Adjustment of Subcutaneous Anticoagulants (Adult)    The guidance below is to provide initial recommendations for dosing. If recommended dose does not align well with patient's current clinical picture, communications with the care team will occur to determine most appropriate medication and dose.    TABLE 1.  ENOXAPARIN ROUTINE PROPHYLAXIS DOSING (Medically ill, routine surgery)   Patient Weight (kg)     50.9 and below 51 - 100.9 101 - 150.9 151 - 174.9 175 or greater         Estimated CrCl  (ml/min) 30 or greater   30 mg SUBQ daily   40 mg SUBQ daily 30 mg SUBQ BID  40 mg SUBQ BID 60mg SUBQ BID      15-29 UFH 5000 units SUBQ BID   30 mg SUBQ daily 30 mg SUBQ daily 40 mg SUBQ daily   60 mg SUBQ daily      Less than 15 or Dialysis UFH 5000 units SUBQ BID   UFH 5000 units SUBQ TID UFH 7500 units SUBQ TID       TABLE 2.  ENOXAPARIN TREATMENT DOSING   (Based on 1mg/kg BID for DVT/PE/AFib)   Patient Weight (kg)     50.9 and below .9 151-189.9 190 or greater         Estimated CrCl  (ml/min) 30 or greater Recommend BS standardized UFH infusion, apixaban or rivaroxaban 1mg/kg SUBQ BID 1mg/kg SUBQ BID if anti-Xa levels are feasible per institution.  Alternatively,  recommend switch to BS standardized UFH infusion     Recommend switch to BS standardized UFH infusion.      15-29 Recommend [facility] standardized UFH infusion or apixaban 1mg/kg SUBQ daily Recommend switch to [facility] standardized UFH infusion     Less than 15 or Dialysis Recommend switch to [facility] standardized UFH infusion.     Aditi

## 2025-03-15 LAB
GLUCOSE BLD-MCNC: 119 MG/DL (ref 70–99)
GLUCOSE BLD-MCNC: 126 MG/DL (ref 70–99)
PERFORMED ON: ABNORMAL
PERFORMED ON: ABNORMAL

## 2025-03-15 PROCEDURE — 6360000002 HC RX W HCPCS: Performed by: INTERNAL MEDICINE

## 2025-03-15 PROCEDURE — 6370000000 HC RX 637 (ALT 250 FOR IP): Performed by: INTERNAL MEDICINE

## 2025-03-15 PROCEDURE — 94760 N-INVAS EAR/PLS OXIMETRY 1: CPT

## 2025-03-15 PROCEDURE — 2500000003 HC RX 250 WO HCPCS: Performed by: INTERNAL MEDICINE

## 2025-03-15 PROCEDURE — 2060000000 HC ICU INTERMEDIATE R&B

## 2025-03-15 RX ORDER — ASPIRIN 81 MG/1
81 TABLET, CHEWABLE ORAL DAILY
Qty: 30 TABLET | Refills: 0
Start: 2025-03-16

## 2025-03-15 RX ADMIN — Medication 2000 UNITS: at 09:25

## 2025-03-15 RX ADMIN — ENOXAPARIN SODIUM 30 MG: 100 INJECTION SUBCUTANEOUS at 09:25

## 2025-03-15 RX ADMIN — LATANOPROST 1 DROP: 50 SOLUTION OPHTHALMIC at 20:48

## 2025-03-15 RX ADMIN — ASPIRIN 81 MG: 81 TABLET, CHEWABLE ORAL at 09:26

## 2025-03-15 RX ADMIN — SODIUM CHLORIDE, PRESERVATIVE FREE 10 ML: 5 INJECTION INTRAVENOUS at 09:39

## 2025-03-15 RX ADMIN — WATER 1000 MG: 1 INJECTION INTRAMUSCULAR; INTRAVENOUS; SUBCUTANEOUS at 12:44

## 2025-03-15 RX ADMIN — CYANOCOBALAMIN TAB 500 MCG 500 MCG: 500 TAB at 09:26

## 2025-03-15 RX ADMIN — SODIUM CHLORIDE, PRESERVATIVE FREE 10 ML: 5 INJECTION INTRAVENOUS at 20:48

## 2025-03-15 RX ADMIN — ATORVASTATIN CALCIUM 80 MG: 80 TABLET, FILM COATED ORAL at 20:48

## 2025-03-15 RX ADMIN — MEMANTINE 15 MG: 10 TABLET ORAL at 09:25

## 2025-03-15 NOTE — PLAN OF CARE
Problem: Chronic Conditions and Co-morbidities  Goal: Patient's chronic conditions and co-morbidity symptoms are monitored and maintained or improved  Outcome: Progressing  Flowsheets (Taken 3/15/2025 0218)  Care Plan - Patient's Chronic Conditions and Co-Morbidity Symptoms are Monitored and Maintained or Improved: Monitor and assess patient's chronic conditions and comorbid symptoms for stability, deterioration, or improvement     Problem: Discharge Planning  Goal: Discharge to home or other facility with appropriate resources  Outcome: Progressing  Flowsheets (Taken 3/15/2025 0218)  Discharge to home or other facility with appropriate resources: Identify barriers to discharge with patient and caregiver     Problem: Neurosensory - Adult  Goal: Achieves stable or improved neurological status  Outcome: Progressing  Flowsheets (Taken 3/15/2025 0218)  Achieves stable or improved neurological status: Assess for and report changes in neurological status     Problem: Cardiovascular - Adult  Goal: Maintains optimal cardiac output and hemodynamic stability  Outcome: Progressing  Flowsheets (Taken 3/15/2025 0218)  Maintains optimal cardiac output and hemodynamic stability: Monitor blood pressure and heart rate     Problem: Musculoskeletal - Adult  Goal: Return ADL status to a safe level of function  Outcome: Progressing  Flowsheets (Taken 3/15/2025 0218)  Return ADL Status to a Safe Level of Function: Assess activities of daily living deficits and provide assistive devices as needed     Problem: Genitourinary - Adult  Goal: Absence of urinary retention  Outcome: Progressing  Flowsheets (Taken 3/15/2025 0218)  Absence of urinary retention: Assess patient’s ability to void and empty bladder     Problem: Skin/Tissue Integrity  Goal: Skin integrity remains intact  Description: 1.  Monitor for areas of redness and/or skin breakdown  2.  Assess vascular access sites hourly  3.  Every 4-6 hours minimum:  Change oxygen saturation

## 2025-03-15 NOTE — DISCHARGE INSTR - COC
Continuity of Care Form    Patient Name: Junior Krishnamurthy   :  1934  MRN:  [id number]    Admit date:  3/10/2025  Discharge date:  2025    Code Status Order: Full Code   Advance Directives:     Admitting Physician:  Sudha España MD  PCP: No primary care provider on file.    Discharging Nurse: Jennifer PEACOCK RN  Discharging Hospital Unit/Room#: T3D-1553/5121-01  Discharging Unit Phone Number: [phone]    Emergency Contact:   Extended Emergency Contact Information  Primary Emergency Contact: JATIN KRISHNAMURTHY  Mobile Phone: [phone]  Relation: Child  Preferred language: English   needed? No  Secondary Emergency Contact: MarycruzFrancy  Address: [address] [place] [address]  Home Phone: [phone]  Relation: Brother/Sister    Past Surgical History:  Past Surgical History:   Procedure Laterality Date    CATARACT REMOVAL      GLAUCOMA SURGERY         Immunization History:   Immunization History   Administered Date(s) Administered    COVID-19, PFIZER PURPLE top, DILUTE for use, (age 12 y+), 30mcg/0.3mL 2021    Pneumococcal, PPSV23, PNEUMOVAX 23, (age 2y+), SC/IM, 0.5mL 10/03/2001, 2017    TD 5LF, TENIVAC, (age 7y+), IM, 0.5mL 1988    TDaP, ADACEL (age 10y-64y), BOOSTRIX (age 10y+), IM, 0.5mL 2017       Active Problems:  Patient Active Problem List   Diagnosis Code    Spondylolisthesis M43.10    Lumbar stenosis with neurogenic claudication M48.062    Diarrhea R19.7    Slurred speech R47.81    Severe dementia with agitation (HCC) F03.C11       Isolation/Infection:   Isolation            No Isolation          Patient Infection Status    None to display              Nurse Assessment:  Last Vital Signs: /66   Pulse 99   Temp 98.5 °F (36.9 °C) (Oral)   Resp 18   Ht 1.626 m (5' 4\")   Wt 60 kg (132 lb 4.4 oz)   SpO2 99%   BMI 22.71 kg/m²     Last documented pain score (0-10 scale):    Last Weight:   Wt Readings from

## 2025-03-15 NOTE — DISCHARGE SUMMARY
Hospital Medicine Discharge Summary    Patient ID: Junior Krishnamurthy      Patient's PCP: No primary care provider on file.    Admit Date: 3/10/2025     Discharge Date:   03/15/2025    Admitting Provider: Sudha España MD     Discharge Provider: Ez Gan MD     Discharge Diagnoses:       Active Hospital Problems    Diagnosis     Slurred speech [R47.81]     Severe dementia with agitation (HCC) [F03.C11]        The patient was seen and examined on day of discharge and this discharge summary is in conjunction with any daily progress note from day of discharge.    Hospital Course:       From HPI:\"Junior Krishnamurthy is a 89 y.o. male [facility] with severe dementia with unknown pmh who presents with slurred speech.     In the emergency room his temperature was 36.7, blood pressure 140/81 with a pulse of 97, respirations 17, sats 98% on room air, BMI 24.29.\"          Dysarthria, with possible acute stroke aspirin statin MRI ordered and noted no acute changes, neurology consulted on admission back to baseline will discharge to SNF will keep on aspirin at this time MRI showing microvascular disease I will discontinue statin at this time due to needs to follow-up with neurology and PCP as outpatient for further medical reconciliation if deemed necessary  Dementia, with possible delirium at this time improved now discharging to SNF  UTI, present on admission received IV antibiotics.  Anemia, appears chronic no signs of bleeding.  Hypercholesterolemia diet for now repeat lipid panel as outpatient and if deemed necessary statin can be started at this time statin did not start as outpatient as patient is not having acute event and to avoid potential side effects  Minimally elevated troponin due to above        Physical Exam Performed:     /66   Pulse 99   Temp 98.5 °F (36.9 °C) (Oral)   Resp 18   Ht 1.626 m (5' 4\")   Wt 60 kg (132 lb 4.4 oz)   SpO2 99%   BMI 22.71 kg/m²       General appearance:  No  tablet by mouth daily  Qty: 30 tablet, Refills: 0                Details   vitamin B-12 (CYANOCOBALAMIN) 500 MCG tablet Take 1 tablet by mouth daily      vitamin D (CHOLECALCIFEROL) 25 MCG (1000 UT) TABS tablet Take 2 tablets by mouth daily      memantine (NAMENDA) 10 MG tablet Take 1.5 tablets by mouth daily      latanoprost (XALATAN) 0.005 % ophthalmic solution              Time Spent on discharge: 35 minutes in the examination, evaluation, counseling and review of medications and discharge plan.      Signed:    Ez Gan MD   3/15/2025      Thank you No primary care provider on file. for the opportunity to be involved in this patient's care. If you have any questions or concerns, please feel free to contact me at [phone].    Comment: Please note this report has been produced using speech recognition software and may contain errors related to that system including errors in grammar, punctuation, and spelling, as well as words and phrases that may be inappropriate. If there are any questions or concerns, please feel free to contact the dictating provider for clarification.

## 2025-03-15 NOTE — CARE COORDINATION
Lm w/ admissions at [facility] [phone] to see if able to accept patient for skilled nursing, await return call.   May need more SNF choices from daughter if [facility] unable to accept.   Per prior CM notes, looks like long term plan is for patient to eventually move in with daughter in [place].   Since looking for SNF placement in [place], its unlikely patient will discharge this weekend.    Electronically signed by Kenzie Espinoza RN Case Management on 3/15/2025 at 11:00 AM      Spoke to daughter Ramona. Informed her [facility] hasn't returned our calls to let us know if able to accept or not. Informed daughter the [facility] she mentioned[facility] and this is not what is currently recommended for her dad.   Asked Ramona to see if there are any other nursing homes she would be interested in [place] for rehab or to consider a local area skilled nursing facility. Ramona did say her cousin has a family member in a nursing home in [place] and they would consider a facility here.   Daughter says after her dad rehabs, she will have him move in with her in [place]. Daughter to call me back once she knows what facility she would like in [facility]. If we do find a facility in [place], daughter would transport her dad to [place].   Daughter is currently visiting in [place] sorting her dad's affairs to get him ready to move in with her.  Await more SNF choices from daughter.    Electronically signed by Kenzie Espinoza RN Case Management on 3/15/2025 at 1:26 PM       Brian Greene would like a referral to [facility] in [place]. This is a [facility] facility, so called [facility] at [facility] (covering for Kateryna). She will review patient and forward information to intake at [facility].     Electronically signed by Kenzie Espinoza RN Case Management on

## 2025-03-16 PROCEDURE — 6360000002 HC RX W HCPCS: Performed by: INTERNAL MEDICINE

## 2025-03-16 PROCEDURE — 2500000003 HC RX 250 WO HCPCS: Performed by: INTERNAL MEDICINE

## 2025-03-16 PROCEDURE — 1200000000 HC SEMI PRIVATE

## 2025-03-16 PROCEDURE — 6370000000 HC RX 637 (ALT 250 FOR IP): Performed by: INTERNAL MEDICINE

## 2025-03-16 PROCEDURE — 94760 N-INVAS EAR/PLS OXIMETRY 1: CPT

## 2025-03-16 RX ADMIN — SODIUM CHLORIDE, PRESERVATIVE FREE 10 ML: 5 INJECTION INTRAVENOUS at 08:55

## 2025-03-16 RX ADMIN — CYANOCOBALAMIN TAB 500 MCG 500 MCG: 500 TAB at 08:54

## 2025-03-16 RX ADMIN — LATANOPROST 1 DROP: 50 SOLUTION OPHTHALMIC at 19:46

## 2025-03-16 RX ADMIN — MEMANTINE 15 MG: 10 TABLET ORAL at 08:54

## 2025-03-16 RX ADMIN — Medication 2000 UNITS: at 08:54

## 2025-03-16 RX ADMIN — SODIUM CHLORIDE, PRESERVATIVE FREE 10 ML: 5 INJECTION INTRAVENOUS at 19:45

## 2025-03-16 RX ADMIN — ATORVASTATIN CALCIUM 80 MG: 80 TABLET, FILM COATED ORAL at 19:49

## 2025-03-16 RX ADMIN — WATER 1000 MG: 1 INJECTION INTRAMUSCULAR; INTRAVENOUS; SUBCUTANEOUS at 12:16

## 2025-03-16 RX ADMIN — ENOXAPARIN SODIUM 30 MG: 100 INJECTION SUBCUTANEOUS at 08:54

## 2025-03-16 RX ADMIN — ASPIRIN 81 MG: 81 TABLET, CHEWABLE ORAL at 08:54

## 2025-03-16 NOTE — PROGRESS NOTES
4 Eyes Skin Assessment     NAME:  Junior Krishnamurthy  YOB: 1934  MEDICAL RECORD NUMBER:  [id number]    The patient is being assessed for  Transfer to New Unit    I agree that at least one RN has performed a thorough Head to Toe Skin Assessment on the patient. ALL assessment sites listed below have been assessed.      Areas assessed by both nurses:    Head, Face, Ears, Shoulders, Back, Chest, Arms, Elbows, Hands, Sacrum. Buttock, Coccyx, Ischium, Legs. Feet and Heels, and Under Medical Devices         Does the Patient have a Wound? Yes wound(s) were present on assessment. LDA wound assessment was Initiated and completed by RN       Larry Prevention initiated by RN: Yes  Wound Care Orders initiated by RN: No    Pressure Injury (Stage 3,4, Unstageable, DTI, NWPT, and Complex wounds) if present, place Wound referral order by RN under : No    New Ostomies, if present place, Ostomy referral order under : No     Nurse 1 eSignature: Electronically signed by Lisbeth Hirsch RN on 3/16/25 at 11:25 AM EDT    **SHARE this note so that the co-signing nurse can place an eSignature**    Nurse 2 eSignature: Electronically signed by KIRAN FLEMING RN on 3/16/25 at 12:18 PM EDT

## 2025-03-16 NOTE — PROGRESS NOTES
V2.0    [facility] [place]      Name:  Junior Krishnamurthy /Age/Sex: 1934  (89 y.o. male)   MRN & CSN:  [id number] & [id number] Encounter Date/Time: 3/16/2025 12:23 PM EDT   Location:  [id number] PCP: No primary care provider on file.     Attending:Ez Gan MD       Hospital Day: 7    Assessment and Recommendations   Junior Krishnamurthy is a 89 y.o. male who presents with Slurred speech      Plan:   Dysarthria, with possible acute stroke aspirin statin MRI ordered and noted no acute changes, neurology consulted on admission back to baseline will discharge to SNF will keep on aspirin at this time MRI showing microvascular disease I will discontinue statin at this time due to needs to follow-up with neurology and PCP as outpatient for further medical reconciliation if deemed necessary.  No events last night  Dementia, with possible delirium at this time improved, could fluctuate, patient in need of continuous medical management at this time, now discharging to SNF  UTI, present on admission received IV antibiotics.  Improving  Anemia, appears chronic no signs of bleeding.  Hypercholesterolemia diet for now repeat lipid panel as outpatient and if deemed necessary statin can be started at this time statin did not start as outpatient as patient is not having acute event and to avoid potential side effects  Minimally elevated troponin due to above        Diet ADULT DIET; Regular; Kosher   DVT Prophylaxis [] Lovenox, []  Heparin, [] SCDs, [] Ambulation,  [] Eliquis, [] Xarelto  [] Coumadin   Code Status Full Code             Personally reviewed Lab Studies and Imaging         Rhythm strip independently interpreted by myself heart rate 83 QT 0 36 no ST elevation        Medical Decision Making:  The following items were considered in medical decision making:  Discussion of patient care with other providers  Reviewed clinical lab tests  Reviewed radiology tests  Reviewed other diagnostic

## 2025-03-16 NOTE — PLAN OF CARE
Problem: Chronic Conditions and Co-morbidities  Goal: Patient's chronic conditions and co-morbidity symptoms are monitored and maintained or improved  Outcome: Progressing  Flowsheets (Taken 3/16/2025 0223)  Care Plan - Patient's Chronic Conditions and Co-Morbidity Symptoms are Monitored and Maintained or Improved: Monitor and assess patient's chronic conditions and comorbid symptoms for stability, deterioration, or improvement     Problem: Discharge Planning  Goal: Discharge to home or other facility with appropriate resources  Outcome: Progressing  Flowsheets (Taken 3/16/2025 0223)  Discharge to home or other facility with appropriate resources: Identify barriers to discharge with patient and caregiver     Problem: Safety - Adult  Goal: Free from fall injury  Outcome: Progressing  Flowsheets (Taken 3/16/2025 0223)  Free From Fall Injury: Instruct family/caregiver on patient safety     Problem: Neurosensory - Adult  Goal: Achieves stable or improved neurological status  Outcome: Progressing  Flowsheets (Taken 3/16/2025 0223)  Achieves stable or improved neurological status: Assess for and report changes in neurological status     Problem: Cardiovascular - Adult  Goal: Maintains optimal cardiac output and hemodynamic stability  Outcome: Progressing  Flowsheets (Taken 3/16/2025 0223)  Maintains optimal cardiac output and hemodynamic stability: Monitor blood pressure and heart rate     Problem: Musculoskeletal - Adult  Goal: Return ADL status to a safe level of function  Outcome: Progressing  Flowsheets (Taken 3/16/2025 0223)  Return ADL Status to a Safe Level of Function: Assess activities of daily living deficits and provide assistive devices as needed     Problem: Skin/Tissue Integrity  Goal: Skin integrity remains intact  Description: 1.  Monitor for areas of redness and/or skin breakdown  2.  Assess vascular access sites hourly  3.  Every 4-6 hours minimum:  Change oxygen saturation probe site  4.  Every 4-6

## 2025-03-16 NOTE — PROGRESS NOTES
Patient being transferred to 4th floor room 3491. Report called to receiving nurse. Patient transferring in bed via transport. Electronically signed by Lisbeth Hirsch RN on 3/16/2025 at 10:43 AM

## 2025-03-16 NOTE — CARE COORDINATION
Spoke to Hi alfaro/ [facility], says rep from [facility] would have to do an on site for patient on Monday d/t previously being in restraints.  F/u with rep on Monday to see when on site will be completed and if they can accept or not.    Electronically signed by Kenzie Espinoza RN on 3/16/2025 at 2:25 PM

## 2025-03-17 PROCEDURE — 6370000000 HC RX 637 (ALT 250 FOR IP): Performed by: INTERNAL MEDICINE

## 2025-03-17 PROCEDURE — 97530 THERAPEUTIC ACTIVITIES: CPT

## 2025-03-17 PROCEDURE — 97535 SELF CARE MNGMENT TRAINING: CPT

## 2025-03-17 PROCEDURE — 2500000003 HC RX 250 WO HCPCS: Performed by: INTERNAL MEDICINE

## 2025-03-17 PROCEDURE — 92526 ORAL FUNCTION THERAPY: CPT

## 2025-03-17 PROCEDURE — 1200000000 HC SEMI PRIVATE

## 2025-03-17 PROCEDURE — 6360000002 HC RX W HCPCS: Performed by: INTERNAL MEDICINE

## 2025-03-17 RX ADMIN — SODIUM CHLORIDE, PRESERVATIVE FREE 10 ML: 5 INJECTION INTRAVENOUS at 20:35

## 2025-03-17 RX ADMIN — CYANOCOBALAMIN TAB 500 MCG 500 MCG: 500 TAB at 10:15

## 2025-03-17 RX ADMIN — SODIUM CHLORIDE, PRESERVATIVE FREE 10 ML: 5 INJECTION INTRAVENOUS at 10:22

## 2025-03-17 RX ADMIN — ATORVASTATIN CALCIUM 80 MG: 80 TABLET, FILM COATED ORAL at 20:34

## 2025-03-17 RX ADMIN — MEMANTINE 15 MG: 10 TABLET ORAL at 10:15

## 2025-03-17 RX ADMIN — Medication 2000 UNITS: at 10:15

## 2025-03-17 RX ADMIN — LATANOPROST 1 DROP: 50 SOLUTION OPHTHALMIC at 20:35

## 2025-03-17 RX ADMIN — POLYETHYLENE GLYCOL 3350 17 G: 17 POWDER, FOR SOLUTION ORAL at 04:25

## 2025-03-17 RX ADMIN — ENOXAPARIN SODIUM 30 MG: 100 INJECTION SUBCUTANEOUS at 10:21

## 2025-03-17 RX ADMIN — ASPIRIN 81 MG: 81 TABLET, CHEWABLE ORAL at 10:15

## 2025-03-17 NOTE — PROGRESS NOTES
Shift assessment done. All night time medications given per MAR. Patient took all his oral medications whole with water, tolerated well. All fall precautions implemented. Virtual  in place. All needs attended.Electronically signed by Bonnie Ryan RN on 3/16/2025 at 11:03 PM

## 2025-03-17 NOTE — CARE COORDINATION
Chart review done, pt is needing snf, [facility], came and completed onsite. They have clinically accepted, however pt will need to be sitter, tele sitter, and restraint free. SW informed the RN, and will get MD to discontinue.   SW informed daughter Ramona, who agreed with the discharge plan at this time.     SNF in [facility] vs [place]--ref to [facility] in [place]. & ref to [facility] in [place]--mx messages but no call back yet; FYI no SNF benefits thru [facility]--using Medicare benefits for SNF; [facility] [phone].     Will follow.     Claus Eugene LMSW, [facility] [[email or web address]]  [facility] Social Work Case Management   Phone: [phone]  Fax: [phone]

## 2025-03-17 NOTE — CARE COORDINATION
Spoke with daughter, Ramona, regarding DC plan and as long as patient remains with out a sitter he will be able to be discharged to [facility].     Daughter is agreeable with this plan. Will follow up tomorrow.     Electronically signed by Kenzie Reyez RN on 3/17/2025 at 2:28 PM

## 2025-03-17 NOTE — PROGRESS NOTES
Patient calm and cooperative this shift. No attempts to transfer or independently or remove IV. Confirmed with tele sitter no behaviors observed. Attending MD notified. New order to discontinue tele. Tele sitter camera removed @ 1572.

## 2025-03-17 NOTE — PROGRESS NOTES
[facility]  SLP DYSPHAGIA THERAPY  DISCHARGE SUMMARY    Patient: Junior Krishnamurthy   : 1934   MRN: [id number]    Treatment Date: 3/17/2025   Admitting Diagnosis:   Slurred speech [R47.81]  Cognitive impairment [R41.89]   has no past medical history on file.   has a past surgical history that includes Cataract removal and Glaucoma surgery.  Allergies: NKA  Dysphagia History including instrumental assessment: none on record  Baseline/Prior Level of Function: Living Status: admitted from home alone, Per chart: Daughter lives in [place] and is in the process of getting pt moved there so she can take care of him. Right now, he lives alone and has caregiver 8a-2p Monday-Friday. [facility].; Baseline diet: regular/thin    Onset: 3/10/2025     Treatment Diagnosis: Oropharyngeal dysphagia    CURRENT ENCOUNTER DIAGNOSTICS/COURSE OF ADMISSION     3/10/2025 admitted with c/o slurred speech. MD ADMISSION H&P HPI: \"Junior Krishnamurthy is a 89 y.o. male [facility] patient with severe dementia with unknown pmh who presents with slurred speech.\"   Consults noted: Neurology     Most Recent Imaging:  3/10/2025 CXR: IMPRESSION: Findings suggest congestive heart failure  3/10/2025 CT Head: IMPRESSION: No acute intracranial abnormality.  3/13/2025 MRI Brain: IMPRESSION: 1. No acute intracranial abnormality. 2. Severe chronic microvascular disease. 3. Bilateral temporal lobe encephalomalacia is greater on the left.    Current Diet Level : Regular texture, Thin liquids      Respiratory Status: Room Air     Reason for initial referral: SLP evaluation orders received due to CVA protocol     SUBJECTIVE     General: Accepted and tolerated tx feed at bedside    Comments regarding diet toleration:   Patient: denies concerns  Family: not present/available  Staff: no concerns    Pain:  Did not state    Dentition: Adequate  Vision: Adequate for assessment/tx needs  Hearing: [facility]    TREATMENT SUMMARY / IMPRESSIONS     Dysphagia  03/17/25 . All goals are ongoing at this time unless indicated above.    Dysphagia Therapeutic Intervention:  Diet Tolerance Monitoring , Patient/Family Education       EDUCATION     Provided education regarding role of SLP, results of assessment, recommendations and general speech pathology plan of care.   Patient Response: Pt verbalized understanding and agreement , Concern for reduced/recall insight  Family education: Family not present/unavailable  Staff education: RN verbalized understanding      If patient discharges prior to next visit, this note will serve as discharge.     Timed Code Minutes: 0  Total Treatment Minutes: 13    Electronically signed by:    Daniela Ramírez MA, CCC-SLP, #[id number]  Speech-Language Pathologist  Portable phone: [phone]   03/17/25  1:00 PM

## 2025-03-17 NOTE — PROGRESS NOTES
Physical Therapy  Facility/Department: [address] MED SURG  Physical Therapy Treatment Note    Name: Junior Krishnamurthy  : 1934  MRN: [id number]  Date of Service: 3/17/2025    Discharge Recommendations:  Subacute/Skilled Nursing Facility     Junior Krishnamurthy scored a 14/24 on the AM-PAC short mobility form. Current research shows that an AM-PAC score of 17 or less is typically not associated with a discharge to the patient's home setting. Based on the patient's AM-PAC score and their current functional mobility deficits, it is recommended that the patient have 3-5 sessions per week of Physical Therapy at d/c to increase the patient's independence.  Please see assessment section for further patient specific details.    If patient discharges prior to next session this note will serve as a discharge summary.  Please see below for the latest assessment towards goals.           Patient Diagnosis(es): The primary encounter diagnosis was Cognitive impairment. A diagnosis of Slurred speech was also pertinent to this visit.  Past Medical History:  has no past medical history on file.  Past Surgical History:  has a past surgical history that includes Cataract removal and Glaucoma surgery.    Assessment  Assessment: pt is a 89 yo male who was adm to [facility] with slurred speech; pt at baseline lives alone and has a HHA 5/xwk from 8-2.  Pt currently confused but able to participate in therapy; he is needing significant assist for mobility tasks due to decreased cognition and balance deficits.  Pt is a high fall risk and is not  safe to return home alone.  Pt would benefit from continued therapy either in SNF to address deficits to allow pt to regain his max potential  Therapy Prognosis: Fair  Decision Making: Medium Complexity  History: see above  Exam: see below  Clinical Presentation: evolving  Barriers to Learning: cognition  Activity Tolerance  Activity Tolerance: Treatment limited secondary to decreased cognition;Patient

## 2025-03-17 NOTE — PROGRESS NOTES
Occupational Therapy  Facility/Department: [address] MED SURG  Occupational Therapy Daily Treatment    Name: Junior Krishnamurthy  : 1934  MRN: [id number]  Date of Service: 3/17/2025    Discharge Recommendations:  Patient would benefit from continued therapy after discharge, 3-5 sessions per week     Junior Krishnamurthy scored a 13/24 on the AM-PAC ADL Inpatient form. Current research shows that an AM-PAC score of 17 or less is typically not associated with a discharge to the patient's home setting. Based on the patient's AM-PAC score and their current ADL deficits, it is recommended that the patient have 3-5 sessions per week of Occupational Therapy at d/c to increase the patient's independence.  Please see assessment section for further patient specific details.    If patient discharges prior to next session this note will serve as a discharge summary.  Please see below for the latest assessment towards goals.      Patient Diagnosis(es): The primary encounter diagnosis was Cognitive impairment. A diagnosis of Slurred speech was also pertinent to this visit.  Past Medical History:  has no past medical history on file.  Past Surgical History:  has a past surgical history that includes Cataract removal and Glaucoma surgery.       Assessment  Performance deficits / Impairments: Decreased functional mobility ;Decreased safe awareness;Decreased balance;Decreased ADL status;Decreased cognition;Decreased high-level IADLs;Decreased endurance;Decreased strength  Assessment: 89 y/o male admitted 3/10/2025 with slurred speech. Pt with hx of dementia. Per chart, pt lives alone, has caregiver 8-2 Mon-Friday. Pt reports requires intermittent assist with ADLs and functional mobility with 4WW. Pt remains below baseline, limited by confusion, decreased balance, decreased strength/endurance. He required mod-max A for functional transfers and mobility w/ 4WW, min A for standing balance during toileting, and setup-SBA seated grooming. Pt

## 2025-03-17 NOTE — PLAN OF CARE
Problem: Safety - Adult  Goal: Free from fall injury  3/16/2025 2301 by Bonnie Ryan RN  Outcome: Progressing  3/16/2025 1854 by Stacy Murphy RN  Outcome: Progressing     Problem: Neurosensory - Adult  Goal: Achieves stable or improved neurological status  3/16/2025 2301 by Bonnie Ryan RN  Outcome: Progressing  3/16/2025 1854 by Stacy Murphy RN  Outcome: Progressing  Goal: Absence of seizures  3/16/2025 2301 by Bonnie Ryan RN  Outcome: Progressing  3/16/2025 1854 by Stacy Murphy RN  Outcome: Progressing  Goal: Remains free of injury related to seizures activity  3/16/2025 2301 by Bonnie Ryan RN  Outcome: Progressing  3/16/2025 1854 by Stacy Murphy RN  Outcome: Progressing     Problem: Musculoskeletal - Adult  Goal: Return mobility to safest level of function  3/16/2025 2301 by Bonnie Ryan RN  Outcome: Progressing  3/16/2025 1854 by Stacy Murphy RN  Outcome: Progressing  Goal: Return ADL status to a safe level of function  3/16/2025 2301 by Bonnie Ryan RN  Outcome: Progressing  3/16/2025 1854 by Stacy Murphy RN  Outcome: Progressing     Problem: Genitourinary - Adult  Goal: Absence of urinary retention  3/16/2025 2301 by Bonnie Ryan RN  Outcome: Progressing  3/16/2025 1854 by Stacy Murphy RN  Outcome: Progressing  Goal: Urinary catheter remains patent  3/16/2025 2301 by Bonnie Ryan RN  Outcome: Progressing  3/16/2025 1854 by Stacy Murphy RN  Outcome: Progressing     Problem: Skin/Tissue Integrity  Goal: Skin integrity remains intact  Description: 1.  Monitor for areas of redness and/or skin breakdown  2.  Assess vascular access sites hourly  3.  Every 4-6 hours minimum:  Change oxygen saturation probe site  4.  Every 4-6 hours:  If on nasal continuous positive airway pressure, respiratory therapy assess nares and determine need for appliance change or resting period  3/16/2025 2301 by Bonnie Ryan RN  Outcome:

## 2025-03-17 NOTE — PROGRESS NOTES
V2.0    [facility] – [place], [place] Progress Note      Name:  Junior Krishnamurthy /Age/Sex: 1934  (89 y.o. male)   MRN & CSN:  [id number] & [id number] Encounter Date/Time: 3/17/2025 8:20 AM EDT   Location:  [id number] PCP: No primary care provider on file.     Attending:Ez Gan MD       Hospital Day: 8    Assessment and Recommendations   Junior Krishnamurthy is a 89 y.o. male who presents with Slurred speech      Plan:     Dysarthria, with possible acute stroke aspirin statin MRI ordered and noted no acute changes, neurology consulted on admission back to baseline will discharge to SNF will keep on aspirin at this time MRI showing microvascular disease I will discontinue statin at this time due to needs to follow-up with neurology and PCP as outpatient for further medical reconciliation if deemed necessary.  Chart reviewed no events last night  Dementia, with possible delirium at this time improved, could fluctuate, patient continue to be at risk for delirium and need medical evaluation and management at this time.  UTI, present on admission received IV antibiotics.  Improving  Anemia, appears chronic no signs of bleeding.  Hypercholesterolemia diet for now repeat lipid panel as outpatient and if deemed necessary statin can be started at this time statin did not start as outpatient as patient is not having acute event and to avoid potential side effects  Minimally elevated troponin due to above        Diet ADULT DIET; Regular; Kosher   DVT Prophylaxis [] Lovenox, []  Heparin, [] SCDs, [] Ambulation,  [] Eliquis, [] Xarelto  [] Coumadin   Code Status Full Code             Personally reviewed Lab Studies and Imaging     Discussed management of the case with case management who recommended SNF      Medical Decision Making:  The following items were considered in medical decision making:  Discussion of patient care with other providers  Reviewed clinical lab tests  Reviewed radiology tests  Reviewed other diagnostic  3/10/2025 3:56 pm TECHNIQUE: CT of the head was performed without the administration of intravenous contrast. Automated exposure control, iterative reconstruction, and/or weight based adjustment of the mA/kV was utilized to reduce the radiation dose to as low as reasonably achievable. COMPARISON: 03/23/2021 HISTORY: ORDERING SYSTEM PROVIDED HISTORY: long roche,  dementia TECHNOLOGIST PROVIDED HISTORY: Has a \"code stroke\" or \"stroke alert\" been called?->No Reason for exam:->long today, hx dementia Decision Support Exception - unselect if not a suspected or confirmed emergency medical condition->Emergency Medical Condition (MA) Reason for Exam: long roche, hx dementia FINDINGS: BRAIN/VENTRICLES: There is no acute intracranial hemorrhage, mass effect or midline shift.  No abnormal extra-axial fluid collection.  The gray-white differentiation is maintained without evidence of an acute infarct.  There is no evidence of hydrocephalus. Chronic atrophic and ischemic microvascular changes are similar to the prior exam.  Left temporal encephalomalacia is stable. ORBITS: The visualized portion of the orbits demonstrate no acute abnormality. SINUSES: There is chronic opacification of the maxillary sinuses. SOFT TISSUES/SKULL:  No acute abnormality of the visualized skull or soft tissues.     No acute intracranial abnormality.     XR CHEST (2 VW)  Result Date: 3/10/2025  EXAMINATION: TWO XRAY VIEWS OF THE CHEST 3/10/2025 3:58 pm COMPARISON: 03/23/2021 HISTORY: ORDERING SYSTEM PROVIDED HISTORY: long TECHNOLOGIST PROVIDED HISTORY: Reason for exam:->long Reason for Exam: [facility] FINDINGS: Mild cardiomegaly.  Pulmonary vascular congestion.  Pulmonary edema.  No pneumothorax.  No pleural effusion.     Findings suggest congestive heart failure       CBC: No results for input(s): \"WBC\", \"HGB\", \"PLT\" in the last 72 hours.  BMP:  No results for input(s): \"NA\", \"K\", \"CL\", \"CO2\", \"BUN\", \"CREATININE\", \"GLUCOSE\"

## 2025-03-17 NOTE — PLAN OF CARE
Problem: Chronic Conditions and Co-morbidities  Goal: Patient's chronic conditions and co-morbidity symptoms are monitored and maintained or improved  Outcome: Progressing     Problem: Discharge Planning  Goal: Discharge to home or other facility with appropriate resources  Outcome: Progressing     Problem: Safety - Adult  Goal: Free from fall injury  Outcome: Progressing     Problem: Neurosensory - Adult  Goal: Achieves stable or improved neurological status  Outcome: Progressing     Problem: Neurosensory - Adult  Goal: Absence of seizures  Outcome: Progressing     Problem: Neurosensory - Adult  Goal: Remains free of injury related to seizures activity  Outcome: Progressing     Problem: Cardiovascular - Adult  Goal: Maintains optimal cardiac output and hemodynamic stability  Outcome: Progressing     Problem: Cardiovascular - Adult  Goal: Absence of cardiac dysrhythmias or at baseline  Outcome: Progressing     Problem: Musculoskeletal - Adult  Goal: Return mobility to safest level of function  Outcome: Progressing     Problem: Musculoskeletal - Adult  Goal: Return ADL status to a safe level of function  Outcome: Progressing     Problem: Genitourinary - Adult  Goal: Absence of urinary retention  Outcome: Progressing     Problem: Genitourinary - Adult  Goal: Urinary catheter remains patent  Outcome: Progressing     Problem: Skin/Tissue Integrity  Goal: Skin integrity remains intact  Description: 1.  Monitor for areas of redness and/or skin breakdown  2.  Assess vascular access sites hourly  3.  Every 4-6 hours minimum:  Change oxygen saturation probe site  4.  Every 4-6 hours:  If on nasal continuous positive airway pressure, respiratory therapy assess nares and determine need for appliance change or resting period  Outcome: Progressing

## 2025-03-18 VITALS
HEIGHT: 64 IN | RESPIRATION RATE: 18 BRPM | SYSTOLIC BLOOD PRESSURE: 117 MMHG | DIASTOLIC BLOOD PRESSURE: 68 MMHG | TEMPERATURE: 97.9 F | HEART RATE: 74 BPM | WEIGHT: 139.99 LBS | BODY MASS INDEX: 23.9 KG/M2 | OXYGEN SATURATION: 100 %

## 2025-03-18 PROCEDURE — 94760 N-INVAS EAR/PLS OXIMETRY 1: CPT

## 2025-03-18 PROCEDURE — 6370000000 HC RX 637 (ALT 250 FOR IP): Performed by: INTERNAL MEDICINE

## 2025-03-18 PROCEDURE — 97530 THERAPEUTIC ACTIVITIES: CPT

## 2025-03-18 PROCEDURE — 6360000002 HC RX W HCPCS: Performed by: INTERNAL MEDICINE

## 2025-03-18 PROCEDURE — 97535 SELF CARE MNGMENT TRAINING: CPT

## 2025-03-18 PROCEDURE — 2500000003 HC RX 250 WO HCPCS: Performed by: INTERNAL MEDICINE

## 2025-03-18 RX ORDER — BISACODYL 10 MG
10 SUPPOSITORY, RECTAL RECTAL ONCE
Status: COMPLETED | OUTPATIENT
Start: 2025-03-18 | End: 2025-03-18

## 2025-03-18 RX ORDER — SENNA AND DOCUSATE SODIUM 50; 8.6 MG/1; MG/1
2 TABLET, FILM COATED ORAL DAILY PRN
Qty: 60 TABLET | Refills: 0
Start: 2025-03-18

## 2025-03-18 RX ORDER — SENNA AND DOCUSATE SODIUM 50; 8.6 MG/1; MG/1
2 TABLET, FILM COATED ORAL DAILY PRN
Status: DISCONTINUED | OUTPATIENT
Start: 2025-03-18 | End: 2025-03-18 | Stop reason: HOSPADM

## 2025-03-18 RX ADMIN — MEMANTINE 15 MG: 10 TABLET ORAL at 08:15

## 2025-03-18 RX ADMIN — CYANOCOBALAMIN TAB 500 MCG 500 MCG: 500 TAB at 08:15

## 2025-03-18 RX ADMIN — ENOXAPARIN SODIUM 30 MG: 100 INJECTION SUBCUTANEOUS at 08:44

## 2025-03-18 RX ADMIN — SENNOSIDES AND DOCUSATE SODIUM 2 TABLET: 50; 8.6 TABLET ORAL at 15:38

## 2025-03-18 RX ADMIN — SODIUM CHLORIDE, PRESERVATIVE FREE 10 ML: 5 INJECTION INTRAVENOUS at 08:17

## 2025-03-18 RX ADMIN — Medication 2000 UNITS: at 08:15

## 2025-03-18 RX ADMIN — ASPIRIN 81 MG: 81 TABLET, CHEWABLE ORAL at 08:15

## 2025-03-18 RX ADMIN — POLYETHYLENE GLYCOL 3350 17 G: 17 POWDER, FOR SOLUTION ORAL at 08:15

## 2025-03-18 RX ADMIN — BISACODYL 10 MG: 10 SUPPOSITORY RECTAL at 10:43

## 2025-03-18 NOTE — PLAN OF CARE
Problem: Chronic Conditions and Co-morbidities  Goal: Patient's chronic conditions and co-morbidity symptoms are monitored and maintained or improved  3/18/2025 1132 by Jennifer Nagy RN  Outcome: Progressing     Problem: Discharge Planning  Goal: Discharge to home or other facility with appropriate resources  3/18/2025 1132 by Jennifer Nagy RN  Outcome: Progressing     Problem: Safety - Adult  Goal: Free from fall injury  3/18/2025 1132 by Jennifer Nagy RN  Outcome: Progressing     Problem: Neurosensory - Adult  Goal: Achieves stable or improved neurological status  3/18/2025 1132 by Jennifer Nagy RN  Outcome: Progressing     Problem: Neurosensory - Adult  Goal: Absence of seizures  3/18/2025 1132 by Jennifer Nagy RN  Outcome: Progressing     Problem: Neurosensory - Adult  Goal: Remains free of injury related to seizures activity  3/18/2025 1132 by Jennifer Nagy RN  Outcome: Progressing     Problem: Cardiovascular - Adult  Goal: Maintains optimal cardiac output and hemodynamic stability  3/18/2025 1132 by Jennifer Nagy RN  Outcome: Progressing     Problem: Cardiovascular - Adult  Goal: Absence of cardiac dysrhythmias or at baseline  3/18/2025 1132 by Jennifer Nagy RN  Outcome: Progressing     Problem: Musculoskeletal - Adult  Goal: Return mobility to safest level of function  3/18/2025 1132 by Jennifer Nagy RN  Outcome: Progressing     Problem: Musculoskeletal - Adult  Goal: Return ADL status to a safe level of function  3/18/2025 1132 by Jennifer Nagy RN  Outcome: Progressing     Problem: Genitourinary - Adult  Goal: Absence of urinary retention  3/18/2025 1132 by Jennifer Nagy RN  Outcome: Progressing     Problem: Genitourinary - Adult  Goal: Urinary catheter remains patent  3/18/2025 1132 by Jennifer Nagy RN  Outcome: Progressing     Problem: Skin/Tissue Integrity  Goal: Skin integrity remains intact  Description: 1.  Monitor for areas of redness and/or skin breakdown  2.   Assess vascular access sites hourly  3.  Every 4-6 hours minimum:  Change oxygen saturation probe site  4.  Every 4-6 hours:  If on nasal continuous positive airway pressure, respiratory therapy assess nares and determine need for appliance change or resting period  3/18/2025 1132 by Jennifer Nagy, RN  Outcome: Progressing

## 2025-03-18 NOTE — PROGRESS NOTES
Patient discharged to [facility] per Dr. Gan .   IV removed tolerated well.    Report called to nurseGriselda at  [facility].  Nurse also made aware of need to continue to manage bowels. All question answered. No concerns voiced.   Report and AVS/SVETLANA given to transporter  Patient left via stretcher .  Transported to SNF via  medical transport.

## 2025-03-18 NOTE — PROGRESS NOTES
V2.0    [facility] – [place] Progress Note      Name:  Junior Krishnamurthy /Age/Sex: 1934  (89 y.o. male)   MRN & CSN:  [id number] & [id number] Encounter Date/Time: 3/18/2025 7:21 AM EDT   Location:  [id number] PCP: No primary care provider on file.     Attending:Ez Gan MD       Hospital Day: 9    Assessment and Recommendations   Junior Krishnamurthy is a 89 y.o. male who presents with Slurred speech      Plan:   Dysarthria, with possible acute stroke aspirin statin MRI ordered and noted no acute changes, neurology consulted on admission back to baseline will discharge to SNF will keep on aspirin at this time MRI showing microvascular disease I will discontinue statin at this time due to needs to follow-up with neurology and PCP as outpatient for further medical reconciliation if deemed necessary.  Medical visit necessary to evaluate for delirium monitoring for worsening of dysarthria  Dementia, with possible delirium at this time improved, could fluctuate, patient continue to be at risk for delirium.  Medical visit was necessary today to evaluate for complications like delirium.  UTI, present on admission received IV antibiotics.  Improving  Anemia, appears chronic no signs of bleeding.  Hypercholesterolemia diet for now repeat lipid panel as outpatient and if deemed necessary statin can be started at this time statin did not start as outpatient as patient is not having acute event and to avoid potential side effects  Minimally elevated troponin due to above        Diet ADULT DIET; Regular; Kosher   DVT Prophylaxis [] Lovenox, []  Heparin, [] SCDs, [] Ambulation,  [] Eliquis, [] Xarelto  [] Coumadin   Code Status Full Code             Personally reviewed Lab Studies and Imaging     Discussed management of the case with case management who recommended SNF        Medical Decision Making:  The following items were considered in medical decision making:  Discussion of patient care with other providers  Reviewed

## 2025-03-18 NOTE — PROGRESS NOTES
endurance;Patient limited by fatigue  Bed mobility  Supine to Sit: Unable to assess  Sit to Supine: Unable to assess  Bed Mobility Comments: in recliner at start and end of session  Transfers  Sit to stand: Maximum assistance  Stand to sit: Maximum assistance;Moderate assistance  Transfer Comments: to/from 4WW; assist to manage brakes, repeated cues for hand placement. Heavy posterior lean  Vision  Vision: Within Functional Limits  Hearing  Hearing: Exceptions to WFL  Hearing Exceptions: Hard of hearing/hearing concerns  Cognition  Overall Cognitive Status: Exceptions  Arousal/Alertness: Appears intact  Following Commands: Follows one step commands with repetition  Attention Span: Attends with cues to redirect  Memory: Decreased short term memory;Decreased recall of recent events;Decreased long term memory  Safety Judgement: Decreased awareness of need for safety;Decreased awareness of need for assistance  Problem Solving: Decreased awareness of errors;Assistance required to correct errors made;Assistance required to identify errors made  Insights: Decreased awareness of deficits  Initiation: Requires cues for some  Sequencing: Requires cues for some  Orientation  Overall Orientation Status: Impaired  Orientation Level: Disoriented to place;Disoriented to time;Disoriented to situation;Oriented to person                  Education Given To: Patient  Education Provided: Role of Therapy;Transfer Training;Plan of Care;ADL Adaptive Strategies  Education Method: Demonstration;Verbal  Barriers to Learning: Cognition  Education Outcome: Continued education needed    AM-PAC - ADL  AM-PAC Daily Activity - Inpatient   How much help is needed for putting on and taking off regular lower body clothing?: Total  How much help is needed for bathing (which includes washing, rinsing, drying)?: A Lot  How much help is needed for toileting (which includes using toilet, bedpan, or urinal)?: Total  How much help is needed for putting on  and taking off regular upper body clothing?: A Little  How much help is needed for taking care of personal grooming?: A Little  How much help for eating meals?: A Little  AM-PAC Inpatient Daily Activity Raw Score: 13  AM-PAC Inpatient ADL T-Scale Score : 32.03  ADL Inpatient CMS 0-100% Score: 63.03  ADL Inpatient CMS G-Code Modifier : CL      Goals  Short Term Goals  Time Frame for Short Term Goals: Prior to DC: goals ongoing  Short Term Goal 1: Pt will complete ADL transfer/mobility with supervision  Short Term Goal 2: Pt will tolerate standing > 5 min for functional task with SBA  Short Term Goal 3: Pt will complete toileting with SBA  Short Term Goal 4: Pt will complete LB Dressing with SBA  Patient Goals   Patient goals : no goal stated 2/2 cognition      Therapy Time   Individual Concurrent Group Co-treatment   Time In 0955         Time Out 1025         Minutes 30                 Ely Ngo OTR/L [id number]

## 2025-03-18 NOTE — CARE COORDINATION
3/18 PLAN: Discharge to [facility] when sitter free x 24 h. HENs completed. Need BLS transport. Electronically signed by Ashlyn Bryant RN on 3/18/2025 at 8:28 AM

## 2025-03-18 NOTE — PLAN OF CARE
Problem: Chronic Conditions and Co-morbidities  Goal: Patient's chronic conditions and co-morbidity symptoms are monitored and maintained or improved  3/17/2025 2142 by Jeni Sung RN  Outcome: Progressing  3/17/2025 1426 by Jennifer Nagy RN  Outcome: Progressing     Problem: Discharge Planning  Goal: Discharge to home or other facility with appropriate resources  3/17/2025 2142 by Jeni Sung RN  Outcome: Progressing  3/17/2025 1426 by Jennifer Nagy RN  Outcome: Progressing     Problem: Safety - Adult  Goal: Free from fall injury  3/17/2025 2142 by Jeni Sung RN  Outcome: Progressing  3/17/2025 1426 by Jennifer Nagy RN  Outcome: Progressing     Problem: Neurosensory - Adult  Goal: Achieves stable or improved neurological status  3/17/2025 2142 by Jeni Sung RN  Outcome: Progressing  3/17/2025 1426 by Jennifer Nagy RN  Outcome: Progressing  Goal: Absence of seizures  3/17/2025 2142 by Jeni Sung RN  Outcome: Progressing  3/17/2025 1426 by Jennifer Nagy RN  Outcome: Progressing  Goal: Remains free of injury related to seizures activity  3/17/2025 2142 by Jeni Sung RN  Outcome: Progressing  3/17/2025 1426 by Jennifer Nagy RN  Outcome: Progressing     Problem: Cardiovascular - Adult  Goal: Maintains optimal cardiac output and hemodynamic stability  3/17/2025 2142 by Jeni Sung RN  Outcome: Progressing  3/17/2025 1426 by Jennifer Nagy RN  Outcome: Progressing  Goal: Absence of cardiac dysrhythmias or at baseline  3/17/2025 2142 by Jeni Sung RN  Outcome: Progressing  3/17/2025 1426 by Jennifer Nagy RN  Outcome: Progressing     Problem: Musculoskeletal - Adult  Goal: Return mobility to safest level of function  3/17/2025 2142 by Jeni Sung RN  Outcome: Progressing  3/17/2025 1426 by Jennifer Nagy RN  Outcome: Progressing  Goal: Return ADL status to a safe level of function  3/17/2025 2142 by Jeni Sung RN  Outcome: Progressing  3/17/2025 1426  by Yakov, Jennifer, RN  Outcome: Progressing     Problem: Genitourinary - Adult  Goal: Absence of urinary retention  3/17/2025 2142 by Jeni Sung RN  Outcome: Progressing  3/17/2025 1426 by Jennifer Nagy RN  Outcome: Progressing  Goal: Urinary catheter remains patent  3/17/2025 2142 by Jeni Sung RN  Outcome: Progressing  3/17/2025 1426 by Jennifer Nagy RN  Outcome: Progressing     Problem: Skin/Tissue Integrity  Goal: Skin integrity remains intact  Description: 1.  Monitor for areas of redness and/or skin breakdown  2.  Assess vascular access sites hourly  3.  Every 4-6 hours minimum:  Change oxygen saturation probe site  4.  Every 4-6 hours:  If on nasal continuous positive airway pressure, respiratory therapy assess nares and determine need for appliance change or resting period  3/17/2025 2142 by Jeni Sung RN  Outcome: Progressing  3/17/2025 1426 by Jennifer Nagy RN  Outcome: Progressing

## 2025-03-18 NOTE — PROGRESS NOTES
No BM documented since 3/9/2025, Patient given glycolax this morning. Tolerated well.     Patient noted with a just a small BM. Attending notified. New order for Soap suds enema. Enema administered at about 230pm, tolerated well by patient. Patient noted with watery brown output from rectum. No solid stool noted.  Attending noted. Dr. Gan to put in meds to continue at discharge to treat constipation. Continue discharge at 4pm today.

## 2025-03-18 NOTE — PROGRESS NOTES
Physical Therapy  Facility/Department: [address] MED SURG  Physical Therapy Treatment Note    Name: Junior Krishnamurthy  : 1934  MRN: [id number]  Date of Service: 3/18/2025    Discharge Recommendations:  Subacute/Skilled Nursing Facility   PT Equipment Recommendations  Equipment Needed: No    Junior Krishnamurthy scored a 14/24 on the AM-PAC short mobility form. Current research shows that an AM-PAC score of 17 or less is typically not associated with a discharge to the patient's home setting. Based on the patient's AM-PAC score and their current functional mobility deficits, it is recommended that the patient have 3-5 sessions per week of Physical Therapy at d/c to increase the patient's independence.  Please see assessment section for further patient specific details.    If patient discharges prior to next session this note will serve as a discharge summary.  Please see below for the latest assessment towards goals.        Patient Diagnosis(es): The primary encounter diagnosis was Cognitive impairment. A diagnosis of Slurred speech was also pertinent to this visit.  Past Medical History:  has no past medical history on file.  Past Surgical History:  has a past surgical history that includes Cataract removal and Glaucoma surgery.    Assessment  Body Structures, Functions, Activity Limitations Requiring Skilled Therapeutic Intervention: Decreased functional mobility ;Decreased cognition;Decreased endurance  Assessment: pt is a 89 yo male who was adm to hosp with slurred speech; pt at baseline lives alone and has a HHA 5/xwk from 8-2.  Pt continuesand to need significant assist for mobility tasks due to decreased cognition and balance deficits.  Pt is a high fall risk and is not  safe to return home alone.  Pt would benefit from continued therapy at low to moderate intensity to address deficits to allow pt to regain his max potential  Therapy Prognosis: Fair  Decision Making: Medium Complexity  Barriers to Learning:  cognition  Requires PT Follow-Up: Yes  Activity Tolerance  Activity Tolerance: Treatment limited secondary to decreased cognition;Patient limited by endurance;Patient limited by fatigue    Plan  Physical Therapy Plan  General Plan: 3-5 times per week  Current Treatment Recommendations: Functional mobility training, Transfer training, Balance training, Strengthening, Cognitive/Perceptual training, Endurance training, Gait training, Therapeutic activities  Safety Devices  Type of Devices: All fall risk precautions in place, Call light within reach, Gait belt, Patient at risk for falls, Left in chair, Chair alarm in place  Restraints  Restraints Initially in Place: No  Restraints: leonel soft wrist retraints    Restrictions  Restrictions/Precautions  Restrictions/Precautions: Fall Risk  Position Activity Restriction  Other Position/Activity Restrictions: hx dementia     Subjective  General  Chart Reviewed: Yes  Additional Pertinent Hx: per H&P note: \"Junior Krishnamurthy is a 89 y.o. male [facility] patient, severely demented with unknown pmh  who presents with Slurred speech.\"  Response To Previous Treatment: Patient with no complaints from previous session.  Family/Caregiver Present: No  Referring Practitioner: Sudha España MD  Referral Date : 03/10/25  Follows Commands: Within Functional Limits  Other (Comment): very distracted  Subjective  Subjective: Pt is agreeable to PT.         Social/Functional History  Social/Functional History  Lives With: Alone  Type of Home: Apartment (2nd level)  Home Layout: One level  Home Access: Level entry, Elevator  Bathroom Shower/Tub: Walk-in shower  Bathroom Equipment: Shower chair, Grab bars in shower  Home Equipment: Walker - 4-Wheeled  Prior Level of Assist for ADLs:  (caregiver assists with showers; pt can toilet and dress self)  Prior Level of Assist for Homemaking:  (light cooking)  Prior Level of Assist for Ambulation: Independent household ambulator, with or without device,

## 2025-03-18 NOTE — CARE COORDINATION
DISCHARGE SUMMARY     DATE OF DISCHARGE: 3/18/25    DISCHARGE DESTINATION: [facility]    FACILITY  [address]  [address]  Report: [phone]  Fax: [phone]     Level of Care: Skilled    Report Number: [phone]    Fax Number:  [phone]    Precert Obtained: N/A    Hens Completed: yes    PASARR: N/A    Notified: RN, Family, and Facility/Agency    Prescriptions Faxed:no    HEMODIALYSIS: No    TRANSPORTATION: Stretcher    Company Name: [facility]     Time: 4:00 PM    Phone Number: [phone]    NEW DME ORDERED: no    COMMENTS: Daughter Ramona notified of discharge plan.Electronically signed by Ashlyn Bryant RN on 3/18/2025 at 2:17 PM

## 2025-03-26 NOTE — PROGRESS NOTES
Physician Progress Note      PATIENT:               JAN JOHNSTON  CSN #:                  [id number]  :                       1934  ADMIT DATE:       3/10/2025 2:15 PM  DISCH DATE:        3/18/2025 6:02 PM  RESPONDING  PROVIDER #:        Ez Gan MD          QUERY TEXT:    Patient with a history of dementia was admitted 3/10-3/18 with dysarthria and   slurred speech. There was concern for a CVA which was later ruled out. Patient   was noted to have dementia with delirium. After study, can the cause of the   dysarthria be further specified as:    The medical record reflects the following:  Risk Factors: 89 year old male  Clinical Indicators:  DC Summary: \"Dysarthria, with possible acute stroke aspirin statin MRI ordered   and noted no acute changes, neurology consulted on admission back to baseline   will discharge to SNF will keep on aspirin at this time MRI showing   microvascular disease I will discontinue statin at this time due to needs to   follow-up with neurology and PCP as outpatient for further medical   reconciliation if deemed necessary. Dementia, with possible delirium at this   time improved now discharging to SNF.\"  Treatment: MRI brain, CThead, neurology consult, SLP evaluation  Options provided:  -- Dysarthria due to dementia.  -- Dysarthria due to other, please specify  -- Other - I will add my own diagnosis  -- Disagree - Not applicable / Not valid  -- Disagree - Clinically unable to determine / Unknown  -- Refer to Clinical Documentation Reviewer    PROVIDER RESPONSE TEXT:    This patient has dysarthria due to dementia.    Query created by: Soraya Morrow on 3/25/2025 10:11 AM      QUERY TEXT:    Patient admitted 3/10-3/18 and was noted to have a troponin of 21 and 26. EKG   noted, \"Normal sinus rhythm, Left anterior fascicular block. Left ventricular   hypertrophy with QRS widening ( R in aVL , Kapil product , Romhilt-Miner   )Abnormal ECG. When compared with ECG of